# Patient Record
Sex: MALE | Race: WHITE | URBAN - METROPOLITAN AREA
[De-identification: names, ages, dates, MRNs, and addresses within clinical notes are randomized per-mention and may not be internally consistent; named-entity substitution may affect disease eponyms.]

---

## 2017-02-01 ENCOUNTER — OFFICE VISIT (OUTPATIENT)
Dept: FAMILY MEDICINE | Facility: CLINIC | Age: 47
End: 2017-02-01
Payer: COMMERCIAL

## 2017-02-01 VITALS
DIASTOLIC BLOOD PRESSURE: 86 MMHG | HEART RATE: 104 BPM | SYSTOLIC BLOOD PRESSURE: 138 MMHG | BODY MASS INDEX: 45.1 KG/M2 | HEIGHT: 70 IN | TEMPERATURE: 100.4 F | OXYGEN SATURATION: 95 % | WEIGHT: 315 LBS

## 2017-02-01 DIAGNOSIS — Z13.1 SCREENING FOR DIABETES MELLITUS: ICD-10-CM

## 2017-02-01 DIAGNOSIS — J32.9 OTHER SINUSITIS: Primary | ICD-10-CM

## 2017-02-01 DIAGNOSIS — R05.9 COUGH: ICD-10-CM

## 2017-02-01 DIAGNOSIS — Z13.220 LIPID SCREENING: ICD-10-CM

## 2017-02-01 DIAGNOSIS — I10 ESSENTIAL HYPERTENSION: ICD-10-CM

## 2017-02-01 PROCEDURE — 99203 OFFICE O/P NEW LOW 30 MIN: CPT | Performed by: PHYSICIAN ASSISTANT

## 2017-02-01 RX ORDER — LISINOPRIL 5 MG/1
5 TABLET ORAL DAILY
Qty: 30 TABLET | Refills: 1 | Status: SHIPPED | OUTPATIENT
Start: 2017-02-01 | End: 2017-04-05

## 2017-02-01 RX ORDER — ALBUTEROL SULFATE 90 UG/1
2 AEROSOL, METERED RESPIRATORY (INHALATION) EVERY 6 HOURS
Qty: 1 INHALER | Refills: 0 | Status: SHIPPED | OUTPATIENT
Start: 2017-02-01 | End: 2017-04-05

## 2017-02-01 RX ORDER — HYDROCHLOROTHIAZIDE 25 MG/1
25 TABLET ORAL DAILY
Qty: 30 TABLET | Refills: 1 | Status: SHIPPED | OUTPATIENT
Start: 2017-02-01 | End: 2017-04-05

## 2017-02-01 NOTE — PROGRESS NOTES
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 Is going to schedule DEBRA with fasting labs. New insurance     Problem list and histories reviewed & adjusted, as indicated.  Additional history: as documented    Patient Active Problem List   Diagnosis     Hyperlipidemia     Seizures (H)     Sleep apnea     Essential hypertension     Past Surgical History   Procedure Laterality Date     Tonsils and adnoids         Social History   Substance Use Topics     Smoking status: Never Smoker      Smokeless tobacco: Not on file     Alcohol Use: Yes      Comment: less than once a month      Family History   Problem Relation Age of Onset     Bronchitis Mother      Hypertension Mother          Current Outpatient Prescriptions   Medication Sig Dispense Refill     amoxicillin-clavulanate (AUGMENTIN) 875-125 MG per tablet Take 1 tablet by mouth 2 times daily 20 tablet 0     albuterol (PROAIR HFA/PROVENTIL HFA/VENTOLIN HFA) 108 (90 BASE) MCG/ACT Inhaler Inhale 2 puffs into the lungs every 6 hours 1 Inhaler 0     hydrochlorothiazide (HYDRODIURIL) 25 MG tablet Take 1 tablet (25 mg) by mouth daily 30 tablet 1     lisinopril (PRINIVIL/ZESTRIL) 5 MG tablet Take 1 tablet (5 mg) by mouth daily 30 tablet 1     No Known Allergies  Problem list, Medication list, Allergies, and  "Medical/Social/Surgical histories reviewed in [facility] and updated as appropriate.    ROS:  Constitutional, HEENT, cardiovascular, pulmonary, gi and gu systems are negative, except as otherwise noted.    OBJECTIVE:                                                    /86 mmHg  Pulse 104  Temp(Src) 100.4  F (38  C) (Oral)  Ht 5' 9.5\" (1.765 m)  Wt 346 lb (156.945 kg)  BMI 50.38 kg/m2  SpO2 95%  Body mass index is 50.38 kg/(m^2).  GENERAL: healthy, alert and no distress  EYES: Eyes grossly normal to inspection, PERRL and conjunctivae and sclerae normal  HENT: ear canals and TM's normal, nose and mouth without ulcers or lesions. Nasal congestion with posterior nasal drainage.  maxillary tenderness.   NECK: no adenopathy, no asymmetry, masses, or scars and thyroid normal to palpation  RESP:diffuse wheezes  CV: regular rate and rhythm, normal S1 S2, no S3 or S4, no murmur, click or rub, no peripheral edema     Diagnostic Test Results:  none      ASSESSMENT/PLAN:                                                          ICD-10-CM    1. Other sinusitis J32.9 amoxicillin-clavulanate (AUGMENTIN) 875-125 MG per tablet     albuterol (PROAIR HFA/PROVENTIL HFA/VENTOLIN HFA) 108 (90 BASE) MCG/ACT Inhaler   2. Cough R05 amoxicillin-clavulanate (AUGMENTIN) 875-125 MG per tablet     albuterol (PROAIR HFA/PROVENTIL HFA/VENTOLIN HFA) 108 (90 BASE) MCG/ACT Inhaler   3. Screening for diabetes mellitus Z13.1 Comprehensive metabolic panel   4. Lipid screening Z13.220 Lipid panel reflex to direct LDL   5. Essential hypertension I10 hydrochlorothiazide (HYDRODIURIL) 25 MG tablet     lisinopril (PRINIVIL/ZESTRIL) 5 MG tablet   meds refilled.   Warning signs discussed.  side effects discussed  Symptomatic treatment: such as fluids, OTC acetaminophen and /or non-steroidal anti-inflammatory medication.  Follow up  1-2 wks as needed     Cole Cheng PA-C  [facility]    "

## 2017-02-01 NOTE — MR AVS SNAPSHOT
"              After Visit Summary   2017    Jovi Kenney    MRN: [id number]           Patient Information     Date Of Birth          1970        Visit Information        Provider Department      2017 12:00 PM Cole Cheng PA-C [facility]        Today's Diagnoses     Other sinusitis    -  1     Cough            Follow-ups after your visit        Who to contact     If you have questions or need follow up information about today's clinic visit or your schedule please contact [facility] directly at [phone].  Normal or non-critical lab and imaging results will be communicated to you by [employer], letter or phone within 4 business days after the clinic has received the results. If you do not hear from us within 7 days, please contact the clinic through [employer] or phone. If you have a critical or abnormal lab result, we will notify you by phone as soon as possible.  Submit refill requests through [employer] or call your pharmacy and they will forward the refill request to us. Please allow 3 business days for your refill to be completed.          Additional Information About Your Visit        MyChart Information     [employer] lets you send messages to your doctor, view your test results, renew your prescriptions, schedule appointments and more. To sign up, go to [email or web address] . Click on \"Log in\" on the left side of the screen, which will take you to the Welcome page. Then click on \"Sign up Now\" on the right side of the page.     You will be asked to enter the access code listed below, as well as some personal information. Please follow the directions to create your username and password.     Your access code is: [email or web address]  Expires: 2017 12:14 PM     Your access code will  in 90 days. If you need help or a new code, please call your [facility] or [phone].        Care EveryWhere ID     This is your Care EveryWhere ID. This could be used by other " "organizations to access your [facility] medical records  [id number]        Your Vitals Were     Pulse Temperature Height BMI (Body Mass Index) Pulse Oximetry       104 100.4  F (38  C) (Oral) 5' 9.5\" (1.765 m) 50.38 kg/m2 95%        Blood Pressure from Last 3 Encounters:   02/01/17 138/86    Weight from Last 3 Encounters:   02/01/17 346 lb (156.945 kg)              Today, you had the following     No orders found for display         Today's Medication Changes          These changes are accurate as of: 2/1/17 12:14 PM.  If you have any questions, ask your nurse or doctor.               Start taking these medicines.        Dose/Directions    albuterol 108 (90 BASE) MCG/ACT Inhaler   Commonly known as:  PROAIR HFA/PROVENTIL HFA/VENTOLIN HFA   Used for:  Cough, Other sinusitis   Started by:  Cole Cheng PA-C        Dose:  2 puff   Inhale 2 puffs into the lungs every 6 hours   Quantity:  1 Inhaler   Refills:  0       amoxicillin-clavulanate 875-125 MG per tablet   Commonly known as:  AUGMENTIN   Used for:  Other sinusitis, Cough   Started by:  Cole Cheng PA-C        Dose:  1 tablet   Take 1 tablet by mouth 2 times daily   Quantity:  20 tablet   Refills:  0            Where to get your medicines      These medications were sent to [facility] [place], [place] - [address]  [address], [place] [address]     Phone:  [phone]    - albuterol 108 (90 BASE) MCG/ACT Inhaler  - amoxicillin-clavulanate 875-125 MG per tablet             Primary Care Provider Office Phone #    [facility] [phone]       No address on file        Thank you!     [facility] you for [facility]  [facility]. Our goal is always to provide you with excellent care. Hearing back from our patients is one way we can continue to improve our services. Please take a few minutes to complete the written survey that you may receive in the mail after your visit with us. Thank you!   "           Your Updated Medication List - Protect others around you: Learn how to safely use, store and throw away your medicines at www.disposemymeds.org.          This list is accurate as of: 2/1/17 12:14 PM.  Always use your most recent med list.                   Brand Name Dispense Instructions for use    albuterol 108 (90 BASE) MCG/ACT Inhaler    PROAIR HFA/PROVENTIL HFA/VENTOLIN HFA    1 Inhaler    Inhale 2 puffs into the lungs every 6 hours       amoxicillin-clavulanate 875-125 MG per tablet    AUGMENTIN    20 tablet    Take 1 tablet by mouth 2 times daily

## 2017-02-01 NOTE — NURSING NOTE
"Chief Complaint   Patient presents with     Cough       Initial /86 mmHg  Pulse 104  Temp(Src) 100.4  F (38  C) (Oral)  Ht 5' 9.5\" (1.765 m)  Wt 346 lb (156.945 kg)  BMI 50.38 kg/m2  SpO2 95% Estimated body mass index is 50.38 kg/(m^2) as calculated from the following:    Height as of this encounter: 5' 9.5\" (1.765 m).    Weight as of this encounter: 346 lb (156.945 kg).  BP completed using cuff size: [place] CMA      "

## 2017-02-01 NOTE — Clinical Note
[facility]  [address] [facility] [address]  Phone: [phone]    February 1, 2017        Jovi Kenney  [address] [facility] [address]          To whom it may concern:    RE: Jovi Kenney    Patient was seen and treated today at our clinic and missed work.       Please contact me for questions or concerns.      Sincerely,        Cole Cheng PA-C

## 2017-04-01 DIAGNOSIS — Z13.220 LIPID SCREENING: ICD-10-CM

## 2017-04-01 DIAGNOSIS — Z13.1 SCREENING FOR DIABETES MELLITUS: ICD-10-CM

## 2017-04-01 PROCEDURE — 80053 COMPREHEN METABOLIC PANEL: CPT | Performed by: PHYSICIAN ASSISTANT

## 2017-04-01 PROCEDURE — 80061 LIPID PANEL: CPT | Performed by: PHYSICIAN ASSISTANT

## 2017-04-01 PROCEDURE — 36415 COLL VENOUS BLD VENIPUNCTURE: CPT | Performed by: PHYSICIAN ASSISTANT

## 2017-04-03 LAB
ALBUMIN SERPL-MCNC: 4.1 G/DL (ref 3.4–5)
ALP SERPL-CCNC: 44 U/L (ref 40–150)
ALT SERPL W P-5'-P-CCNC: 67 U/L (ref 0–70)
ANION GAP SERPL CALCULATED.3IONS-SCNC: 13 MMOL/L (ref 3–14)
AST SERPL W P-5'-P-CCNC: 38 U/L (ref 0–45)
BILIRUB SERPL-MCNC: 0.5 MG/DL (ref 0.2–1.3)
BUN SERPL-MCNC: 14 MG/DL (ref 7–30)
CALCIUM SERPL-MCNC: 9 MG/DL (ref 8.5–10.1)
CHLORIDE SERPL-SCNC: 101 MMOL/L (ref 94–109)
CHOLEST SERPL-MCNC: 210 MG/DL
CO2 SERPL-SCNC: 25 MMOL/L (ref 20–32)
CREAT SERPL-MCNC: 1.04 MG/DL (ref 0.66–1.25)
GFR SERPL CREATININE-BSD FRML MDRD: 76 ML/MIN/1.7M2
GLUCOSE SERPL-MCNC: 88 MG/DL (ref 70–99)
HDLC SERPL-MCNC: 32 MG/DL
LDLC SERPL CALC-MCNC: 131 MG/DL
NONHDLC SERPL-MCNC: 178 MG/DL
POTASSIUM SERPL-SCNC: 3.7 MMOL/L (ref 3.4–5.3)
PROT SERPL-MCNC: 7.4 G/DL (ref 6.8–8.8)
SODIUM SERPL-SCNC: 139 MMOL/L (ref 133–144)
TRIGL SERPL-MCNC: 234 MG/DL

## 2017-04-05 ENCOUNTER — OFFICE VISIT (OUTPATIENT)
Dept: FAMILY MEDICINE | Facility: CLINIC | Age: 47
End: 2017-04-05
Payer: COMMERCIAL

## 2017-04-05 VITALS
HEIGHT: 70 IN | OXYGEN SATURATION: 99 % | SYSTOLIC BLOOD PRESSURE: 126 MMHG | BODY MASS INDEX: 45.1 KG/M2 | DIASTOLIC BLOOD PRESSURE: 83 MMHG | WEIGHT: 315 LBS | HEART RATE: 102 BPM

## 2017-04-05 DIAGNOSIS — L91.8 SKIN TAG: ICD-10-CM

## 2017-04-05 DIAGNOSIS — I10 ESSENTIAL HYPERTENSION: ICD-10-CM

## 2017-04-05 DIAGNOSIS — E78.5 HYPERLIPIDEMIA, UNSPECIFIED HYPERLIPIDEMIA TYPE: ICD-10-CM

## 2017-04-05 DIAGNOSIS — G47.30 SLEEP APNEA, UNSPECIFIED TYPE: ICD-10-CM

## 2017-04-05 DIAGNOSIS — Z00.00 ROUTINE GENERAL MEDICAL EXAMINATION AT A HEALTH CARE FACILITY: Primary | ICD-10-CM

## 2017-04-05 PROCEDURE — 17110 DESTRUCTION B9 LES UP TO 14: CPT | Performed by: PHYSICIAN ASSISTANT

## 2017-04-05 PROCEDURE — 99396 PREV VISIT EST AGE 40-64: CPT | Mod: 25 | Performed by: PHYSICIAN ASSISTANT

## 2017-04-05 RX ORDER — HYDROCHLOROTHIAZIDE 25 MG/1
25 TABLET ORAL DAILY
Qty: 90 TABLET | Refills: 1 | Status: SHIPPED | OUTPATIENT
Start: 2017-04-05 | End: 2017-10-05

## 2017-04-05 RX ORDER — LISINOPRIL 5 MG/1
5 TABLET ORAL DAILY
Qty: 90 TABLET | Refills: 1 | Status: SHIPPED | OUTPATIENT
Start: 2017-04-05 | End: 2017-10-05

## 2017-04-05 NOTE — MR AVS SNAPSHOT
After Visit Summary   4/5/2017    Jovi Kenney    MRN: [id number]           Patient Information     Date Of Birth          1970        Visit Information        Provider Department      4/5/2017 7:20 AM Cole Cheng PA-C [facility]        Today's Diagnoses     Routine general medical examination at a health care facility    -  1    Essential hypertension        Sleep apnea, unspecified type        Hyperlipidemia, unspecified hyperlipidemia type        BMI 50.0-59.9, adult (H)        Skin tag          Care Instructions      Preventive Health Recommendations  Male Ages 40 to 49    Yearly exam:             See your health care provider every year in order to  o   Review health changes.   o   Discuss preventive care.    o   Review your medicines if your doctor has prescribed any.    You should be tested each year for STDs (sexually transmitted diseases) if you re at risk.     Have a cholesterol test every 5 years.     Have a colonoscopy (test for colon cancer) if someone in your family has had colon cancer or polyps before age 50.     After age 45, have a diabetes test (fasting glucose). If you are at risk for diabetes, you should have this test every 3 years.      Talk with your health care provider about whether or not a prostate cancer screening test (PSA) is right for you.    Shots: Get a flu shot each year. Get a tetanus shot every 10 years.     Nutrition:    Eat at least 5 servings of fruits and vegetables daily.     Eat whole-grain bread, whole-wheat pasta and brown rice instead of white grains and rice.     Talk to your provider about Calcium and Vitamin D.     Lifestyle    Exercise for at least 150 minutes a week (30 minutes a day, 5 days a week). This will help you control your weight and prevent disease.     Limit alcohol to one drink per day.     No smoking.     Wear sunscreen to prevent skin cancer.     See your dentist every six months for an exam and cleaning.             Follow-ups after your visit        Additional Services     INTERNAL MEDICINE REFERRAL       Your provider has referred you to: [facility] [phone]   Dr. Juarez- Weight management     Please be aware that coverage of these services is subject to the terms and limitations of your health insurance plan.  Call member services at your health plan with any benefit or coverage questions.      Please bring the following to your appointment:  >>   Any x-rays, CTs or MRIs which have been performed.  Contact the facility where they were done to arrange for  prior to your scheduled appointment.   >>   List of current medications   >>   This referral request   >>   Any documents/labs given to you for this referral            SLEEP EVALUATION & MANAGEMENT REFERRAL - ADULT       Please be aware that coverage of these services is subject to the terms and limitations of your health insurance plan.  Call member services at your health plan with any benefit or coverage questions.      Please bring the following to your appointment:    >>   List of current medications   >>   This referral request   >>   Any documents/labs given to you for this referral    [facility] [phone] (Age 15 and up)                  Future tests that were ordered for you today     Open Future Orders        Priority Expected Expires Ordered    SLEEP EVALUATION & MANAGEMENT REFERRAL - ADULT Routine  4/5/2018 4/5/2017            Who to contact     If you have questions or need follow up information about today's clinic visit or your schedule please contact [facility] directly at [phone].  Normal or non-critical lab and imaging results will be communicated to you by MyChart, letter or phone within 4 business days after the clinic has received the results. If you do not hear from us within 7 days, please contact the clinic through MyChart or phone. If you have a  "critical or abnormal lab result, we will notify you by phone as soon as possible.  Submit refill requests through [employer] or call your pharmacy and they will forward the refill request to us. Please allow 3 business days for your refill to be completed.          Additional Information About Your Visit        [employer] Information     [employer] lets you send messages to your doctor, view your test results, renew your prescriptions, schedule appointments and more. To sign up, go to [email or web address] . Click on \"Log in\" on the left side of the screen, which will take you to the Welcome page. Then click on \"Sign up Now\" on the right side of the page.     You will be asked to enter the access code listed below, as well as some personal information. Please follow the directions to create your username and password.     Your access code is: [email or web address]  Expires: 2017  1:14 PM     Your access code will  in 90 days. If you need help or a new code, please call your [place] clinic or [phone].        Care EveryWhere ID     This is your Care EveryWhere ID. This could be used by other organizations to access your [place] medical records  [id number]        Your Vitals Were     Pulse Height Pulse Oximetry BMI (Body Mass Index)          102 5' 9.5\" (1.765 m) 99% 50.94 kg/m2         Blood Pressure from Last 3 Encounters:   17 126/83   17 138/86    Weight from Last 3 Encounters:   17 (!) 350 lb (158.8 kg)   17 (!) 346 lb (156.9 kg)              We Performed the Following     DESTRUCT BENIGN LESION, UP TO 14     INTERNAL MEDICINE REFERRAL          Where to get your medicines      These medications were sent to [facility] [address] [facility] [address]  [address] [address] [address]     Phone:  [phone]     hydrochlorothiazide 25 MG tablet    lisinopril 5 MG tablet          Primary Care Provider Office Phone #    [facility] " [phone]       No address on file        Thank you!     Thank you for choosing [facility]  for your care. Our goal is always to provide you with excellent care. Hearing back from our patients is one way we can continue to improve our services. Please take a few minutes to complete the written survey that you may receive in the mail after your visit with us. Thank you!             Your Updated Medication List - Protect others around you: Learn how to safely use, store and throw away your medicines at www.disposemymeds.org.          This list is accurate as of: 4/5/17  7:42 AM.  Always use your most recent med list.                   Brand Name Dispense Instructions for use    CLARITIN PO          hydrochlorothiazide 25 MG tablet    HYDRODIURIL    90 tablet    Take 1 tablet (25 mg) by mouth daily       lisinopril 5 MG tablet    PRINIVIL/ZESTRIL    90 tablet    Take 1 tablet (5 mg) by mouth daily

## 2017-04-05 NOTE — PROGRESS NOTES
SUBJECTIVE:     CC: Jovi Kenney is an 47 year old male who presents for preventative health visit.     Healthy Habits:    Do you get at least three servings of calcium containing foods daily (dairy, green leafy vegetables, etc.)? yes    Amount of exercise or daily activities, outside of work: about 3 times a week 1.5 mile walk    Problems taking medications regularly No    Medication side effects: No    Have you had an eye exam in the past two years? no    Do you see a dentist twice per year? no    Do you have sleep apnea, excessive snoring or daytime drowsiness?yes - uses cpap- 2015.      Skin tag under the left eye, interferes with his vision at times- been there coupe years.    Today's PHQ-2 Score:   PHQ-2 ( 1999 Pfizer) 2/1/2017   Q1: Little interest or pleasure in doing things 0   Q2: Feeling down, depressed or hopeless 0   PHQ-2 Score 0       Abuse: Current or Past(Physical, Sexual or Emotional)- No  Do you feel safe in your environment - Yes    Social History   Substance Use Topics     Smoking status: Never Smoker     Smokeless tobacco: Not on file     Alcohol use Yes      Comment: less than once a month      The patient does not drink >3 drinks per day nor >7 drinks per week.    Last PSA: No results found for: PSA    Recent Labs   Lab Test  04/01/17   1102   CHOL  210*   HDL  32*   LDL  131*   TRIG  234*   NHDL  178*       Reviewed orders with patient. Reviewed health maintenance and updated orders accordingly - Yes    Reviewed and updated as needed this visit by clinical staff  Tobacco  Allergies  Meds  Problems  Med Hx  Surg Hx  Fam Hx  Soc Hx          Reviewed and updated as needed this visit by Provider  Allergies  Meds  Problems          Past Medical History:   Diagnosis Date     Hyperlipidemia      Hypertension      Seizures (H)     last was in highschool     Sleep apnea       Past Surgical History:   Procedure Laterality Date     tonsils and adnoids         ROS:  C: NEGATIVE for fever,  chills, change in weight  I: NEGATIVE for worrisome rashes, moles or lesions  E: NEGATIVE for vision changes or irritation  ENT: NEGATIVE for ear, mouth and throat problems  R: NEGATIVE for significant cough or SOB  CV: NEGATIVE for chest pain, palpitations or peripheral edema  GI: NEGATIVE for nausea, abdominal pain, heartburn, or change in bowel habits   male: negative for dysuria, hematuria, decreased urinary stream, erectile dysfunction, urethral discharge  M: NEGATIVE for significant arthralgias or myalgia  N: NEGATIVE for weakness, dizziness or paresthesias  P: NEGATIVE for changes in mood or affect    Problem list, Medication list, Allergies, and Medical/Social/Surgical histories reviewed in [facility] and updated as appropriate.  Labs reviewed in EPIC  BP Readings from Last 3 Encounters:   04/05/17 126/83   02/01/17 138/86    Wt Readings from Last 3 Encounters:   04/05/17 (!) 350 lb (158.8 kg)   02/01/17 (!) 346 lb (156.9 kg)                  Patient Active Problem List   Diagnosis     Seizures (H)     Essential hypertension     Sleep apnea, unspecified type     Hyperlipidemia, unspecified hyperlipidemia type     BMI 50.0-59.9, adult (H)     Skin tag     Past Surgical History:   Procedure Laterality Date     tonsils and adnoids         Social History   Substance Use Topics     Smoking status: Never Smoker     Smokeless tobacco: Not on file     Alcohol use Yes      Comment: less than once a month      Family History   Problem Relation Age of Onset     Bronchitis Mother      Hypertension Mother          Current Outpatient Prescriptions   Medication Sig Dispense Refill     Loratadine (CLARITIN PO)        hydrochlorothiazide (HYDRODIURIL) 25 MG tablet Take 1 tablet (25 mg) by mouth daily 90 tablet 1     lisinopril (PRINIVIL/ZESTRIL) 5 MG tablet Take 1 tablet (5 mg) by mouth daily 90 tablet 1     [DISCONTINUED] hydrochlorothiazide (HYDRODIURIL) 25 MG tablet Take 1 tablet (25 mg) by mouth daily 30 tablet 1      "[DISCONTINUED] lisinopril (PRINIVIL/ZESTRIL) 5 MG tablet Take 1 tablet (5 mg) by mouth daily 30 tablet 1     No Known Allergies  OBJECTIVE:     /83  Pulse 102  Ht 5' 9.5\" (1.765 m)  Wt (!) 350 lb (158.8 kg)  SpO2 99%  BMI 50.94 kg/m2  EXAM:  GENERAL: healthy, alert and no distress  EYES: Eyes grossly normal to inspection, PERRL and conjunctivae and sclerae normal  HENT: ear canals and TM's normal, nose and mouth without ulcers or lesions  NECK: no adenopathy, no asymmetry, masses, or scars and thyroid normal to palpation  RESP: lungs clear to auscultation - no rales, rhonchi or wheezes  CV: regular rate and rhythm, normal S1 S2, no S3 or S4, no murmur, click or rub, no peripheral edema and peripheral pulses strong  ABDOMEN: soft, nontender, no hepatosplenomegaly, no masses and bowel sounds normal   (male): normal male genitalia without lesions or urethral discharge, no hernia  MS: no gross musculoskeletal defects noted, no edema  SKIN: 2 skin tags left eye lid  NEURO: Normal strength and tone, mentation intact and speech normal    ASSESSMENT/PLAN:         ICD-10-CM    1. Routine general medical examination at a health care facility Z00.00    2. Essential hypertension I10 hydrochlorothiazide (HYDRODIURIL) 25 MG tablet     lisinopril (PRINIVIL/ZESTRIL) 5 MG tablet   3. Sleep apnea, unspecified type G47.30 SLEEP EVALUATION & MANAGEMENT REFERRAL - ADULT   4. Hyperlipidemia, unspecified hyperlipidemia type E78.5    5. BMI 50.0-59.9, adult (H) Z68.43 INTERNAL MEDICINE REFERRAL   6. Skin tag L91.8 DESTRUCT BENIGN LESION, UP TO 14   Use of liquid nitrogen was discussed. warning signs discussed. side effects discussed. It was used on skin lesion x 3.  Repeat 2 wks if not gone.   Work on Healthy diet and exercise. Getting heart rate elevated for 30 mins most days of week.  Recheck blood pressure in 6 months.   COUNSELING:  Reviewed preventive health counseling, as reflected in patient instructions       Regular " "exercise       Healthy diet/nutrition         reports that he has never smoked. He does not have any smokeless tobacco history on file.    Estimated body mass index is 50.94 kg/(m^2) as calculated from the following:    Height as of this encounter: 5' 9.5\" (1.765 m).    Weight as of this encounter: 350 lb (158.8 kg).   Weight management plan: Discussed healthy diet and exercise guidelines and patient will follow up in 12 months in clinic to re-evaluate.    Counseling Resources:  ATP IV Guidelines  Pooled Cohorts Equation Calculator  FRAX Risk Assessment  ICSI Preventive Guidelines  Dietary Guidelines for Americans, 2010  USDA's MyPlate  ASA Prophylaxis  Lung CA Screening    The 10-year ASCVD risk score (Sapnawilmer RODRIGUEZ Jr, et al., 2013) is: 5.7%    Values used to calculate the score:      Age: 47 years      Sex: Male      Is Non- : No      Diabetic: No      Tobacco smoker: No      Systolic Blood Pressure: 126 mmHg      Is BP treated: Yes      HDL Cholesterol: 32 mg/dL      Total Cholesterol: 210 mg/dL   Cole Cheng PA-C  [facility]  "

## 2017-04-05 NOTE — NURSING NOTE
"Chief Complaint   Patient presents with     Physical       Initial /83  Pulse 102  Ht 5' 9.5\" (1.765 m)  Wt (!) 350 lb (158.8 kg)  SpO2 99%  BMI 50.94 kg/m2 Estimated body mass index is 50.94 kg/(m^2) as calculated from the following:    Height as of this encounter: 5' 9.5\" (1.765 m).    Weight as of this encounter: 350 lb (158.8 kg).  Medication Reconciliation: complete  Ashlee North CMA    "

## 2017-10-05 ENCOUNTER — TELEPHONE (OUTPATIENT)
Dept: FAMILY MEDICINE | Facility: CLINIC | Age: 47
End: 2017-10-05

## 2017-10-05 DIAGNOSIS — I10 ESSENTIAL HYPERTENSION: ICD-10-CM

## 2017-10-05 RX ORDER — HYDROCHLOROTHIAZIDE 25 MG/1
25 TABLET ORAL DAILY
Qty: 30 TABLET | Refills: 0 | Status: SHIPPED | OUTPATIENT
Start: 2017-10-05 | End: 2018-05-09

## 2017-10-05 RX ORDER — LISINOPRIL 5 MG/1
5 TABLET ORAL DAILY
Qty: 30 TABLET | Refills: 0 | Status: SHIPPED | OUTPATIENT
Start: 2017-10-05 | End: 2018-05-09

## 2017-10-05 NOTE — TELEPHONE ENCOUNTER
Lisinopril and Hydrochlorothiazide patient is requesting refills on both of these medications. Please call patient to advise. Thank you

## 2017-10-05 NOTE — TELEPHONE ENCOUNTER
#30 only as patient due for BP OV with PCP     TC, please assist patient with appointment with provider.     Meredith Feliciano RN

## 2017-10-06 NOTE — TELEPHONE ENCOUNTER
Notified patient needs to be seen for further refills. He will call back to schedule.  PIPER Mathews/Low

## 2018-04-03 ENCOUNTER — OFFICE VISIT (OUTPATIENT)
Dept: PODIATRY | Facility: CLINIC | Age: 48
End: 2018-04-03
Payer: COMMERCIAL

## 2018-04-03 VITALS
SYSTOLIC BLOOD PRESSURE: 162 MMHG | BODY MASS INDEX: 45.1 KG/M2 | DIASTOLIC BLOOD PRESSURE: 99 MMHG | HEIGHT: 70 IN | WEIGHT: 315 LBS

## 2018-04-03 DIAGNOSIS — L60.0 INGROWING NAIL: Primary | ICD-10-CM

## 2018-04-03 DIAGNOSIS — B35.1 DERMATOPHYTOSIS OF NAIL: ICD-10-CM

## 2018-04-03 PROCEDURE — 80076 HEPATIC FUNCTION PANEL: CPT | Performed by: PODIATRIST

## 2018-04-03 PROCEDURE — 99204 OFFICE O/P NEW MOD 45 MIN: CPT | Mod: 25 | Performed by: PODIATRIST

## 2018-04-03 PROCEDURE — 36415 COLL VENOUS BLD VENIPUNCTURE: CPT | Performed by: PODIATRIST

## 2018-04-03 PROCEDURE — 11730 AVULSION NAIL PLATE SIMPLE 1: CPT | Mod: T5 | Performed by: PODIATRIST

## 2018-04-03 RX ORDER — TERBINAFINE HYDROCHLORIDE 250 MG/1
250 TABLET ORAL DAILY
Qty: 30 TABLET | Refills: 2 | Status: SHIPPED | OUTPATIENT
Start: 2018-04-03 | End: 2018-10-17

## 2018-04-03 NOTE — PATIENT INSTRUCTIONS
We wish you continued good healing. If you have any questions or concerns, please do not hesitate to contact us at [phone]    Please remember to call and schedule a follow up appointment if one was recommended at your earliest convenience.   PODIATRY CLINIC HOURS  TELEPHONE NUMBER    Dr. Dima Duran D.P.M [facility]    Clinics:  [facility]    [facility]   Tuesday 1PM-6PM  [place]  Wednesday 7AM-2PM  [facility]  Thursday 10AM-6PM  [place]  Friday 7AM-3PM  [place]  Specialty schedulers:   [phone] to make an appointment with any Specialty Provider.        Urgent Care locations:    [facility] Monday-Friday 5 pm - 9 pm. Saturday-Sunday 9 am -5pm    Monday-Friday 11 am - 9 pm Saturday 9 am - 5 pm     Monday-Sunday 12 noon-8PM [phone]     If you need a medication refill, please contact us you may need lab work and/or a follow up visit prior to your refill (i.e. Antifungal medications).    Ecoviatet (secure e-mail communication and access to your chart) to send a message or to make an appointment.    If MRI needed please call [place] [facility] at [phone]        Weight management plan: Patient was referred to their PCP to discuss a diet and exercise plan.     Patient to follow up with Primary Care provider regarding elevated blood pressure.

## 2018-04-03 NOTE — MR AVS SNAPSHOT
After Visit Summary   4/3/2018    Jovi Kenney    MRN: [id number]           Patient Information     Date Of Birth          1970        Visit Information        Provider Department      4/3/2018 5:00 PM Dima Duran, DPDANIEL [facility] [place]        Today's Diagnoses     Ingrowing nail    -  1    Dermatophytosis of nail          Care Instructions    We wish you continued good healing. If you have any questions or concerns, please do not hesitate to contact us at [phone]    Please remember to call and schedule a follow up appointment if one was recommended at your earliest convenience.   PODIATRY CLINIC HOURS  TELEPHONE NUMBER    Dr. Dima SCHROEDERPYOCASTA FAC FAS    Clinics:  [facility]    [facility]   Tuesday 1PM-6PM  [place]  Wednesday 7AM-2PM  [facility]  Thursday 10AM-6PM  [place]  Friday 7AM-3PM  [place]  Specialty schedulers:   [phone] to make an appointment with any Specialty Provider.        Urgent Care locations:    [facility] Monday-Friday 5 pm - 9 pm. Saturday-Sunday 9 am -5pm    Monday-Friday 11 am - 9 pm Saturday 9 am - 5 pm     Monday-Sunday 12 noon-8PM [phone]     If you need a medication refill, please contact us you may need lab work and/or a follow up visit prior to your refill (i.e. Antifungal medications).    PhysicianPortalt (secure e-mail communication and access to your chart) to send a message or to make an appointment.    If MRI needed please call [place] [facility] at [phone]        Weight management plan: Patient was referred to their PCP to discuss a diet and exercise plan.     Patient to follow up with Primary Care provider regarding elevated blood pressure.              Follow-ups after your visit        Who to contact     If you have questions or need follow up information about  "today's clinic visit or your schedule please contact [facility] [place] directly at [phone].  Normal or non-critical lab and imaging results will be communicated to you by PeopleCubehart, letter or phone within 4 business days after the clinic has received the results. If you do not hear from us within 7 days, please contact the clinic through NaturVentiont or phone. If you have a critical or abnormal lab result, we will notify you by phone as soon as possible.  Submit refill requests through OnLive or call your pharmacy and they will forward the refill request to us. Please allow 3 business days for your refill to be completed.          Additional Information About Your Visit        OnLive Information     OnLive gives you secure access to your electronic health record. If you see a primary care provider, you can also send messages to your care team and make appointments. If you have questions, please call your primary care clinic.  If you do not have a primary care provider, please call [phone] and they will assist you.        Care EveryWhere ID     This is your Care EveryWhere ID. This could be used by other organizations to access your [place] medical records  [id number]        Your Vitals Were     Height BMI (Body Mass Index)                5' 9.5\" (1.765 m) 53.42 kg/m2           Blood Pressure from Last 3 Encounters:   04/03/18 (!) 162/99   04/05/17 126/83   02/01/17 138/86    Weight from Last 3 Encounters:   04/03/18 (!) 367 lb (166.5 kg)   04/05/17 (!) 350 lb (158.8 kg)   02/01/17 (!) 346 lb (156.9 kg)              We Performed the Following     Hepatic panel     REMOVAL OF NAIL PLATE SIMPLE SINGLE          Today's Medication Changes          These changes are accurate as of 4/3/18  5:46 PM.  If you have any questions, ask your nurse or doctor.               Start taking these medicines.        Dose/Directions    terbinafine 250 MG tablet   Commonly known as:  lamISIL   Used for:  " Dermatophytosis of nail   Started by:  Dima Duran DPM        Dose:  250 mg   Take 1 tablet (250 mg) by mouth daily   Quantity:  30 tablet   Refills:  2            Where to get your medicines      These medications were sent to [id number] - [place], [place] - [address]  [address], [place] [address]     Phone:  [phone]     terbinafine 250 MG tablet                Primary Care Provider Office Phone # Fax #    [facility] [place] [facility] [phone] [address]  [place] [address]        Equal Access to Services     [facility]: Hadii aad ku hadasho Soomaali, waaxda luqadaha, qaybta kaalmada adeegyada, waxay danielin hayaan cody ball . [facility] [phone].    ATENCIÓN: Si habla español, tiene a bush disposición servicios gratuitos de asistencia lingüística. [facility] [phone].    We comply with applicable federal civil rights laws and [place] laws. We do not discriminate on the basis of race, color, national origin, age, disability, sex, sexual orientation, or gender identity.            Thank you!     Thank you for choosing [facility]  for your care. Our goal is always to provide you with excellent care. Hearing back from our patients is one way we can continue to improve our services. Please take a few minutes to complete the written survey that you may receive in the mail after your visit with us. Thank you!             Your Updated Medication List - Protect others around you: Learn how to safely use, store and throw away your medicines at www.disposemymeds.org.          This list is accurate as of 4/3/18  5:46 PM.  Always use your most recent med list.                   Brand Name Dispense Instructions for use Diagnosis    CLARITIN PO           hydrochlorothiazide 25 MG tablet    HYDRODIURIL    30 tablet    Take 1 tablet (25 mg) by mouth daily    Essential hypertension       lisinopril 5 MG tablet     PRINIVIL/ZESTRIL    30 tablet    Take 1 tablet (5 mg) by mouth daily    Essential hypertension       terbinafine 250 MG tablet    lamISIL    30 tablet    Take 1 tablet (250 mg) by mouth daily    Dermatophytosis of nail

## 2018-04-03 NOTE — LETTER
4/3/2018         RE: Jovi Kenney  [address]  [place] [address]        Dear Colleague,    Thank you for referring your patient, Jovi Kenney, to the [facility] [place]. Please see a copy of my visit note below.    Patient complains of thick right first toenails(s) .  Has had this for 1 years.  It is slowly getting worse.  Has had pain in the past which is aggravated by activity and relieved by rest.  Now has drainage, erythema, pain both borders.  Tried over the counter medications without success.  Does not like the look of the nail and is wondering about options.  Patient denies heavy OH use.  Denies high cholesterol or being on any statin medication.  Patient does have other family member with this problem.        ROS:  A 10-point review of systems was performed and is positive for that noted in the HPI and as seen below.  All other areas are negative.        No Known Allergies    Current Outpatient Prescriptions   Medication Sig Dispense Refill     terbinafine (LAMISIL) 250 MG tablet Take 1 tablet (250 mg) by mouth daily 30 tablet 2     Loratadine (CLARITIN PO)        hydrochlorothiazide (HYDRODIURIL) 25 MG tablet Take 1 tablet (25 mg) by mouth daily (Patient not taking: Reported on 4/3/2018) 30 tablet 0     lisinopril (PRINIVIL/ZESTRIL) 5 MG tablet Take 1 tablet (5 mg) by mouth daily (Patient not taking: Reported on 4/3/2018) 30 tablet 0       Patient Active Problem List   Diagnosis     Seizures (H)     Essential hypertension     Sleep apnea, unspecified type     Hyperlipidemia, unspecified hyperlipidemia type     BMI 50.0-59.9, adult (H)     Skin tag       Past Medical History:   Diagnosis Date     Hyperlipidemia      Hypertension      Seizures (H)     last was in highschool     Sleep apnea        Past Surgical History:   Procedure Laterality Date     tonsils and adnoids         Family History   Problem Relation Age of Onset     Bronchitis Mother      Hypertension Mother   "      Social History   Substance Use Topics     Smoking status: Never Smoker     Smokeless tobacco: Never Used     Alcohol use Yes      Comment: less than once a month          BP (!) 162/99 (BP Location: Right arm, Patient Position: Sitting, Cuff Size: Adult Large)  Ht 5' 9.5\" (1.765 m)  Wt (!) 367 lb (166.5 kg)  BMI 53.42 kg/m2.      VConstitutional/ general:  Pt is in no apparent distress, appears well-nourished.  Cooperative with history and physical exam. Seen with wife.    Psych:  The patient answered questions appropriately.  Normal affect.  Seems to have reasonable expectations, in terms of treatment.    Eyes:  Visual scanning/ tracking without deficit.    Ears:  Response to auditory stimuli is normal.  negative hearing aid devices.  Auricles in proper alignment.     Lymphatic:  Popliteal lymph nodes not enlarged.     Lungs:  Non labored breathing, non labored speech. No cough.  No audible wheezing. Even, quiet breathing.       Vascular:  Pedal pulses are palpable bilaterally for both the DP and PT arteries.  CFT < 3 sec.  No edema.  Pedal hair growth noted.     Neuro:  Alert and oriented x 3. Coordinated gait.  Light touch sensation is intact to the L4, L5, S1 distributions. No obvious deficits.  No evidence of neurological-based weakness, spasticity, or contracture in the lower extremities.    Derm: Normal texture and turgor.  No erythema, ecchymosis, or cyanosis.  No open lesions. right first toenails(s)  thickened, elongated, discolored, with subungual debris.  Both borders ingrown and there is erythema, edema, drainage, pain on palpation.  No signs of subungual masses or exostosis.    Musculoskeletal:    Lower extremity muscle strength is normal.  Patient is ambulatory without an assistive device or brace.  No gross deformities.  MS 5/5 all compartments.  Normal arch with weightbearing.         A/P  Onychomycosis          Ingrown nail      Discussed etiology and treatment options with the pt.  Risks " and benefits of partial temporary nail removal were discussed in detail.  Obtained consent and blocked hallux using 3 cc of 1% Lidocaine plain into the right hallux.  Sterile prep and avulsed the both border and placed dry sterile dressing.  Cappillary refill time wnl.  Pt tolerated procedure well.  Wound care instructions given.    Discussed all options with patient.  Would like to try lamisil.  Risks, complications, and efficacy of going on this pill were discussed with the patient.  Will start lamisil 250mg, dispense 30, one per oral every day.  Two refils.  Will get LFTs today for baseline.  RTC in 3 months.    Dima Duran DPM, FACFAS                  Again, thank you for allowing me to participate in the care of your patient.        Sincerely,        Dima Duran DPM

## 2018-04-03 NOTE — PROGRESS NOTES
Patient complains of thick right first toenails(s) .  Has had this for 1 years.  It is slowly getting worse.  Has had pain in the past which is aggravated by activity and relieved by rest.  Now has drainage, erythema, pain both borders.  Tried over the counter medications without success.  Does not like the look of the nail and is wondering about options.  Patient denies heavy OH use.  Denies high cholesterol or being on any statin medication.  Patient does have other family member with this problem.        ROS:  A 10-point review of systems was performed and is positive for that noted in the HPI and as seen below.  All other areas are negative.        No Known Allergies    Current Outpatient Prescriptions   Medication Sig Dispense Refill     terbinafine (LAMISIL) 250 MG tablet Take 1 tablet (250 mg) by mouth daily 30 tablet 2     Loratadine (CLARITIN PO)        hydrochlorothiazide (HYDRODIURIL) 25 MG tablet Take 1 tablet (25 mg) by mouth daily (Patient not taking: Reported on 4/3/2018) 30 tablet 0     lisinopril (PRINIVIL/ZESTRIL) 5 MG tablet Take 1 tablet (5 mg) by mouth daily (Patient not taking: Reported on 4/3/2018) 30 tablet 0       Patient Active Problem List   Diagnosis     Seizures (H)     Essential hypertension     Sleep apnea, unspecified type     Hyperlipidemia, unspecified hyperlipidemia type     BMI 50.0-59.9, adult (H)     Skin tag       Past Medical History:   Diagnosis Date     Hyperlipidemia      Hypertension      Seizures (H)     last was in highschool     Sleep apnea        Past Surgical History:   Procedure Laterality Date     tonsils and adnoids         Family History   Problem Relation Age of Onset     Bronchitis Mother      Hypertension Mother        Social History   Substance Use Topics     Smoking status: Never Smoker     Smokeless tobacco: Never Used     Alcohol use Yes      Comment: less than once a month          BP (!) 162/99 (BP Location: Right arm, Patient Position: Sitting, Cuff  "Size: Adult Large)  Ht 5' 9.5\" (1.765 m)  Wt (!) 367 lb (166.5 kg)  BMI 53.42 kg/m2.      VConstitutional/ general:  Pt is in no apparent distress, appears well-nourished.  Cooperative with history and physical exam. Seen with wife.    Psych:  The patient answered questions appropriately.  Normal affect.  Seems to have reasonable expectations, in terms of treatment.    Eyes:  Visual scanning/ tracking without deficit.    Ears:  Response to auditory stimuli is normal.  negative hearing aid devices.  Auricles in proper alignment.     Lymphatic:  Popliteal lymph nodes not enlarged.     Lungs:  Non labored breathing, non labored speech. No cough.  No audible wheezing. Even, quiet breathing.       Vascular:  Pedal pulses are palpable bilaterally for both the DP and PT arteries.  CFT < 3 sec.  No edema.  Pedal hair growth noted.     Neuro:  Alert and oriented x 3. Coordinated gait.  Light touch sensation is intact to the L4, L5, S1 distributions. No obvious deficits.  No evidence of neurological-based weakness, spasticity, or contracture in the lower extremities.    Derm: Normal texture and turgor.  No erythema, ecchymosis, or cyanosis.  No open lesions. right first toenails(s)  thickened, elongated, discolored, with subungual debris.  Both borders ingrown and there is erythema, edema, drainage, pain on palpation.  No signs of subungual masses or exostosis.    Musculoskeletal:    Lower extremity muscle strength is normal.  Patient is ambulatory without an assistive device or brace.  No gross deformities.  MS 5/5 all compartments.  Normal arch with weightbearing.         A/P  Onychomycosis          Ingrown nail      Discussed etiology and treatment options with the pt.  Risks and benefits of partial temporary nail removal were discussed in detail.  Obtained consent and blocked hallux using 3 cc of 1% Lidocaine plain into the right hallux.  Sterile prep and avulsed the both border and placed dry sterile dressing.  " Cappillary refill time wnl.  Pt tolerated procedure well.  Wound care instructions given.    Discussed all options with patient.  Would like to try lamisil.  Risks, complications, and efficacy of going on this pill were discussed with the patient.  Will start lamisil 250mg, dispense 30, one per oral every day.  Two refils.  Will get LFTs today for baseline.  RTC in 3 months.    Dima Duran DPM, FACFAS

## 2018-04-04 LAB
ALBUMIN SERPL-MCNC: 3.9 G/DL (ref 3.4–5)
ALP SERPL-CCNC: 54 U/L (ref 40–150)
ALT SERPL W P-5'-P-CCNC: 60 U/L (ref 0–70)
AST SERPL W P-5'-P-CCNC: 42 U/L (ref 0–45)
BILIRUB DIRECT SERPL-MCNC: <0.1 MG/DL (ref 0–0.2)
BILIRUB SERPL-MCNC: 0.3 MG/DL (ref 0.2–1.3)
PROT SERPL-MCNC: 7.3 G/DL (ref 6.8–8.8)

## 2018-05-09 ENCOUNTER — OFFICE VISIT (OUTPATIENT)
Dept: FAMILY MEDICINE | Facility: CLINIC | Age: 48
End: 2018-05-09
Payer: COMMERCIAL

## 2018-05-09 VITALS
OXYGEN SATURATION: 96 % | RESPIRATION RATE: 16 BRPM | BODY MASS INDEX: 45.1 KG/M2 | HEIGHT: 70 IN | WEIGHT: 315 LBS | HEART RATE: 93 BPM | DIASTOLIC BLOOD PRESSURE: 88 MMHG | SYSTOLIC BLOOD PRESSURE: 130 MMHG | TEMPERATURE: 98.7 F

## 2018-05-09 DIAGNOSIS — Z00.00 ROUTINE GENERAL MEDICAL EXAMINATION AT A HEALTH CARE FACILITY: Primary | ICD-10-CM

## 2018-05-09 DIAGNOSIS — I10 ESSENTIAL HYPERTENSION: ICD-10-CM

## 2018-05-09 DIAGNOSIS — G47.30 SLEEP APNEA, UNSPECIFIED TYPE: ICD-10-CM

## 2018-05-09 PROCEDURE — 99396 PREV VISIT EST AGE 40-64: CPT | Performed by: FAMILY MEDICINE

## 2018-05-09 RX ORDER — LISINOPRIL 10 MG/1
10 TABLET ORAL DAILY
Qty: 90 TABLET | Refills: 1 | Status: SHIPPED | OUTPATIENT
Start: 2018-05-09 | End: 2018-11-15

## 2018-05-09 RX ORDER — HYDROCHLOROTHIAZIDE 25 MG/1
25 TABLET ORAL DAILY
Qty: 90 TABLET | Refills: 1 | Status: SHIPPED | OUTPATIENT
Start: 2018-05-09 | End: 2018-11-15

## 2018-05-09 ASSESSMENT — ENCOUNTER SYMPTOMS: HEADACHES: 1

## 2018-05-09 ASSESSMENT — PAIN SCALES - GENERAL: PAINLEVEL: NO PAIN (0)

## 2018-05-09 NOTE — PROGRESS NOTES
SUBJECTIVE:   CC: Jovi Kenney is an 48 year old male who presents for preventative health visit.     Physical   Annual:     Getting at least 3 servings of Calcium per day::  Yes    Bi-annual eye exam::  Yes    Dental care twice a year::  NO    Sleep apnea or symptoms of sleep apnea::  Sleep apnea    Diet::  Regular (no restrictions)    Frequency of exercise::  1 day/week    Duration of exercise::  15-30 minutes    Taking medications regularly::  Yes    Medication side effects::  None    Additional concerns today::  YES          Headache            Today's PHQ-2 Score:   PHQ-2 ( 1999 Pf[employer]) 5/9/2018   Q1: Little interest or pleasure in doing things 0   Q2: Feeling down, depressed or hopeless 0   PHQ-2 Score 0   Q1: Little interest or pleasure in doing things Not at all   Q2: Feeling down, depressed or hopeless Not at all   PHQ-2 Score 0       Abuse: Current or Past(Physical, Sexual or Emotional)- No  Do you feel safe in your environment - Yes    Social History   Substance Use Topics     Smoking status: Never Smoker     Smokeless tobacco: Never Used     Alcohol use Yes      Comment: less than once a month      Alcohol Use 5/9/2018   If you drink alcohol do you typically have greater than 3 drinks per day OR greater than 7 drinks per week? No       Last PSA: No results found for: PSA    Reviewed orders with patient. Reviewed health maintenance and updated orders accordingly - Yes  SUBJECTIVE:  48 year oldyear old male enters with a hx of hypertension.  Pt. Has been compliant with medications and medications were reviewed.  No side effects. No chest pain or sob. Low sodium diet.    Current Outpatient Prescriptions:      Fexofenadine HCl (ALLEGRA PO), , Disp: , Rfl:      hydrochlorothiazide (HYDRODIURIL) 25 MG tablet, Take 1 tablet (25 mg) by mouth daily, Disp: 90 tablet, Rfl: 1     lisinopril (PRINIVIL/ZESTRIL) 10 MG tablet, Take 1 tablet (10 mg) by mouth daily, Disp: 90 tablet, Rfl: 1     terbinafine (LAMISIL) 250 MG  "tablet, Take 1 tablet (250 mg) by mouth daily, Disp: 30 tablet, Rfl: 2     [DISCONTINUED] hydrochlorothiazide (HYDRODIURIL) 25 MG tablet, Take 1 tablet (25 mg) by mouth daily (Patient not taking: Reported on 4/3/2018), Disp: 30 tablet, Rfl: 0     [DISCONTINUED] lisinopril (PRINIVIL/ZESTRIL) 5 MG tablet, Take 1 tablet (5 mg) by mouth daily (Patient not taking: Reported on 4/3/2018), Disp: 30 tablet, Rfl: 0  Past Medical History:   Diagnosis Date     Hyperlipidemia      Hypertension      Seizures (H)     [facility]     Sleep apnea      Office Visit on 04/03/2018   Component Date Value Ref Range Status     Bilirubin Direct 04/03/2018 <0.1  0.0 - 0.2 mg/dL Final     Bilirubin Total 04/03/2018 0.3  0.2 - 1.3 mg/dL Final     Albumin 04/03/2018 3.9  3.4 - 5.0 g/dL Final     Protein Total 04/03/2018 7.3  6.8 - 8.8 g/dL Final     Alkaline Phosphatase 04/03/2018 54  40 - 150 U/L Final     ALT 04/03/2018 60  0 - 70 U/L Final     AST 04/03/2018 42  0 - 45 U/L Final      Reviewed health maintenance  Patient Active Problem List   Diagnosis     Seizures (H)     Essential hypertension     Sleep apnea, unspecified type     Hyperlipidemia, unspecified hyperlipidemia type     BMI 50.0-59.9, adult (H)     Skin tag         OBJECTIVE:  no apparent distress  /88  Pulse 93  Temp 98.7  F (37.1  C) (Oral)  Resp 16  Ht 5' 9.5\" (1.765 m)  Wt (!) 361 lb (163.7 kg)  SpO2 96%  BMI 52.55 kg/m2     Head: Normocephalic. No masses, lesions, tenderness or abnormalities.  Neck::Neck supple. No adenopathy. Thyroid symmetric, normal size.    Cardiovascular: negative. No lifts, heaves, or thrills. RRR. No murmurs, clicks gallops or rubs  Respiratory. Good diaphragmatic excursion. Lungs clear  Gastrointestinal:Abdomen soft, non-tender.  No masses, organomegaly    Office Visit on 04/03/2018   Component Date Value Ref Range Status     Bilirubin Direct 04/03/2018 <0.1  0.0 - 0.2 mg/dL Final     Bilirubin Total 04/03/2018 0.3  0.2 - " "1.3 mg/dL Final     Albumin 04/03/2018 3.9  3.4 - 5.0 g/dL Final     Protein Total 04/03/2018 7.3  6.8 - 8.8 g/dL Final     Alkaline Phosphatase 04/03/2018 54  40 - 150 U/L Final     ALT 04/03/2018 60  0 - 70 U/L Final     AST 04/03/2018 42  0 - 45 U/L Final         ICD-10-CM    1. Routine general medical examination at a health care facility Z00.00    2. Essential hypertension I10 hydrochlorothiazide (HYDRODIURIL) 25 MG tablet     lisinopril (PRINIVIL/ZESTRIL) 10 MG tablet    PLAN: Follow up in 6 months         Reviewed and updated as needed this visit by clinical staff  Tobacco  Allergies  Meds  Med Hx  Surg Hx  Fam Hx  Soc Hx        Reviewed and updated as needed this visit by Provider            Review of Systems   Neurological: Positive for headaches.     C: NEGATIVE for fever, chills, change in weight  I: NEGATIVE for worrisome rashes, moles or lesions  E: NEGATIVE for vision changes or irritation  ENT: NEGATIVE for ear, mouth and throat problems  R: NEGATIVE for significant cough or SOB  CV: NEGATIVE for chest pain, palpitations or peripheral edema  GI: NEGATIVE for nausea, abdominal pain, heartburn, or change in bowel habits   male: negative for dysuria, hematuria, decreased urinary stream, erectile dysfunction, urethral discharge  M: NEGATIVE for significant arthralgias or myalgia  N: NEGATIVE for weakness, dizziness or paresthesias  P: NEGATIVE for changes in mood or affect    OBJECTIVE:   /88  Pulse 93  Temp 98.7  F (37.1  C) (Oral)  Resp 16  Ht 5' 9.5\" (1.765 m)  Wt (!) 361 lb (163.7 kg)  SpO2 96%  BMI 52.55 kg/m2    Physical Exam  GENERAL: healthy, alert and no distress morbidly obese  EYES: Eyes grossly normal to inspection, PERRL and conjunctivae and sclerae normal  HENT: ear canals and TM's normal, nose and mouth without ulcers or lesions  NECK: no adenopathy, no asymmetry, masses, or scars and thyroid normal to palpation  RESP: lungs clear to auscultation - no rales, rhonchi or " "wheezes  CV: regular rate and rhythm, normal S1 S2, no S3 or S4, no murmur, click or rub, no peripheral edema and peripheral pulses strong  ABDOMEN: soft, nontender, no hepatosplenomegaly, no masses and bowel sounds normal  MS: no gross musculoskeletal defects noted, no edema  SKIN: no suspicious lesions or rashes  NEURO: Normal strength and tone, mentation intact and speech normal  PSYCH: mentation appears normal, affect normal/bright    ASSESSMENT/PLAN:       ICD-10-CM    1. Routine general medical examination at a health care facility Z00.00    2. Essential hypertension I10 hydrochlorothiazide (HYDRODIURIL) 25 MG tablet     lisinopril (PRINIVIL/ZESTRIL) 10 MG tablet   3. BMI 50.0-59.9, adult (H) Z68.43    4. Sleep apnea, unspecified type G47.30      Keep diary of headaches  COUNSELING:   Reviewed preventive health counseling, as reflected in patient instructions       Regular exercise       Healthy diet/nutrition         reports that he has never smoked. He has never used smokeless tobacco.    Estimated body mass index is 52.55 kg/(m^2) as calculated from the following:    Height as of this encounter: 5' 9.5\" (1.765 m).    Weight as of this encounter: 361 lb (163.7 kg).   Weight management plan: Dr Salomon    Counseling Resources:  ATP IV Guidelines  Pooled Cohorts Equation Calculator  FRAX Risk Assessment  ICSI Preventive Guidelines  Dietary Guidelines for Americans, 2010  USDA's MyPlate  ASA Prophylaxis  Lung CA Screening    José Antonio Malhotra MD  [facility]  Answers for HPI/ROS submitted by the patient on 5/9/2018   PHQ-2 Score: 0    "

## 2018-05-09 NOTE — MR AVS SNAPSHOT
After Visit Summary   5/9/2018    Jovi Kenney    MRN: [id number]           Patient Information     Date Of Birth          1970        Visit Information        Provider Department      5/9/2018 3:15 PM José Antonio Malhotra MD [facility]        Today's Diagnoses     Routine general medical examination at a health care facility    -  1    Essential hypertension        BMI 50.0-59.9, adult (H)        Sleep apnea, unspecified type          Care Instructions      Preventive Health Recommendations  Male Ages 40 to 49    Yearly exam:             See your health care provider every year in order to  o   Review health changes.   o   Discuss preventive care.    o   Review your medicines if your doctor has prescribed any.    You should be tested each year for STDs (sexually transmitted diseases) if you re at risk.     Have a cholesterol test every 5 years.     Have a colonoscopy (test for colon cancer) if someone in your family has had colon cancer or polyps before age 50.     After age 45, have a diabetes test (fasting glucose). If you are at risk for diabetes, you should have this test every 3 years.      Talk with your health care provider about whether or not a prostate cancer screening test (PSA) is right for you.    Shots: Get a flu shot each year. Get a tetanus shot every 10 years.     Nutrition:    Eat at least 5 servings of fruits and vegetables daily.     Eat whole-grain bread, whole-wheat pasta and brown rice instead of white grains and rice.     Talk to your provider about Calcium and Vitamin D.     Lifestyle    Exercise for at least 150 minutes a week (30 minutes a day, 5 days a week). This will help you control your weight and prevent disease.     Limit alcohol to one drink per day.     No smoking.     Wear sunscreen to prevent skin cancer.     See your dentist every six months for an exam and cleaning.              Follow-ups after your visit        Future tests that were ordered  "for you today     Open Future Orders        Priority Expected Expires Ordered    Comprehensive metabolic panel (BMP + Alb, Alk Phos, ALT, AST, Total. Bili, TP) Routine  5/9/2019 5/9/2018    Lipid panel reflex to direct LDL Fasting Routine  5/9/2019 5/9/2018            Who to contact     If you have questions or need follow up information about today's clinic visit or your schedule please contact [facility] directly at [phone].  Normal or non-critical lab and imaging results will be communicated to you by iubendahart, letter or phone within 4 business days after the clinic has received the results. If you do not hear from us within 7 days, please contact the clinic through Ivaluat or phone. If you have a critical or abnormal lab result, we will notify you by phone as soon as possible.  Submit refill requests through MyColorScreen or call your pharmacy and they will forward the refill request to us. Please allow 3 business days for your refill to be completed.          Additional Information About Your Visit        iubendaharSungevity Information     MyColorScreen gives you secure access to your electronic health record. If you see a primary care provider, you can also send messages to your care team and make appointments. If you have questions, please call your primary care clinic.  If you do not have a primary care provider, please call [phone] and they will assist you.        Care EveryWhere ID     This is your Care EveryWhere ID. This could be used by other organizations to access your [facility] medical records  [id number]        Your Vitals Were     Pulse Temperature Respirations Height Pulse Oximetry BMI (Body Mass Index)    93 98.7  F (37.1  C) (Oral) 16 5' 9.5\" (1.765 m) 96% 52.55 kg/m2       Blood Pressure from Last 3 Encounters:   05/09/18 130/88   04/03/18 (!) 162/99   04/05/17 126/83    Weight from Last 3 Encounters:   05/09/18 (!) 361 lb (163.7 kg)   04/03/18 (!) 367 lb (166.5 kg)   04/05/17 (!) 350 lb " (158.8 kg)                 Today's Medication Changes          These changes are accurate as of 5/9/18  3:40 PM.  If you have any questions, ask your nurse or doctor.               These medicines have changed or have updated prescriptions.        Dose/Directions    lisinopril 10 MG tablet   Commonly known as:  PRINIVIL/ZESTRIL   This may have changed:    - medication strength  - how much to take   Used for:  Essential hypertension   Changed by:  José Antonio Malhotra MD        Dose:  10 mg   Take 1 tablet (10 mg) by mouth daily   Quantity:  90 tablet   Refills:  1            Where to get your medicines      These medications were sent to [id number]  [place], [place] - [address]  [address], [place] [address]     Phone:  [phone]     hydrochlorothiazide 25 MG tablet    lisinopril 10 MG tablet                Primary Care Provider Office Phone # Fax #    [facility] [phone] [address] [address]        Equal Access to Services     [facility] Center[facility] AH: Hadii aad ku hadasho Soomaali, waaxda luqadaha, qaybta kaalmada adeegyada, waxay idiin hayaan adeeg kharash la'janice . So [facility] [phone].    ATENCIÓN: Si habla español, tiene a bush disposición servicios gratuitos de asistencia lingüística. [facility] [phone].    We comply with applicable federal civil rights laws and [place] laws. We do not discriminate on the basis of race, color, national origin, age, disability, sex, sexual orientation, or gender identity.            Thank you!     Thank you for choosing [facility]  for your [facility]. Our goal is always to provide you with excellent care. Hearing back from our patients is one way we can continue to improve our services. Please take a few minutes to complete the written survey that you may receive in the mail after your visit with us. Thank you!             Your Updated Medication List - Protect others around you: Learn how to  safely use, store and throw away your medicines at www.disposemymeds.org.          This list is accurate as of 5/9/18  3:40 PM.  Always use your most recent med list.                   Brand Name Dispense Instructions for use Diagnosis    ALLEGRA PO           hydrochlorothiazide 25 MG tablet    HYDRODIURIL    90 tablet    Take 1 tablet (25 mg) by mouth daily    Essential hypertension       lisinopril 10 MG tablet    PRINIVIL/ZESTRIL    90 tablet    Take 1 tablet (10 mg) by mouth daily    Essential hypertension       terbinafine 250 MG tablet    lamISIL    30 tablet    Take 1 tablet (250 mg) by mouth daily    Dermatophytosis of nail

## 2018-06-08 DIAGNOSIS — I10 ESSENTIAL HYPERTENSION: ICD-10-CM

## 2018-06-08 LAB
ALBUMIN SERPL-MCNC: 4.1 G/DL (ref 3.4–5)
ALP SERPL-CCNC: 52 U/L (ref 40–150)
ALT SERPL W P-5'-P-CCNC: 63 U/L (ref 0–70)
ANION GAP SERPL CALCULATED.3IONS-SCNC: 8 MMOL/L (ref 3–14)
AST SERPL W P-5'-P-CCNC: 41 U/L (ref 0–45)
BILIRUB SERPL-MCNC: 0.5 MG/DL (ref 0.2–1.3)
BUN SERPL-MCNC: 16 MG/DL (ref 7–30)
CALCIUM SERPL-MCNC: 8.9 MG/DL (ref 8.5–10.1)
CHLORIDE SERPL-SCNC: 104 MMOL/L (ref 94–109)
CHOLEST SERPL-MCNC: 190 MG/DL
CO2 SERPL-SCNC: 26 MMOL/L (ref 20–32)
CREAT SERPL-MCNC: 1.09 MG/DL (ref 0.66–1.25)
GFR SERPL CREATININE-BSD FRML MDRD: 72 ML/MIN/1.7M2
GLUCOSE SERPL-MCNC: 97 MG/DL (ref 70–99)
HDLC SERPL-MCNC: 29 MG/DL
LDLC SERPL CALC-MCNC: 95 MG/DL
NONHDLC SERPL-MCNC: 161 MG/DL
POTASSIUM SERPL-SCNC: 3.7 MMOL/L (ref 3.4–5.3)
PROT SERPL-MCNC: 7.4 G/DL (ref 6.8–8.8)
SODIUM SERPL-SCNC: 138 MMOL/L (ref 133–144)
TRIGL SERPL-MCNC: 328 MG/DL

## 2018-06-08 PROCEDURE — 80061 LIPID PANEL: CPT | Performed by: FAMILY MEDICINE

## 2018-06-08 PROCEDURE — 80053 COMPREHEN METABOLIC PANEL: CPT | Performed by: FAMILY MEDICINE

## 2018-06-08 PROCEDURE — 36415 COLL VENOUS BLD VENIPUNCTURE: CPT | Performed by: FAMILY MEDICINE

## 2018-08-01 NOTE — PROGRESS NOTES
"  SUBJECTIVE:   Jovi Kenney is a 48 year old male who presents to clinic today for the following health issues:  {Provider please address medication reconciliation discrepancies--rooming staff please delete if no med/rec issues}    {Superlists:608763}    {additional problems for provider to add:731843}    Problem list and histories reviewed & adjusted, as indicated.  Additional history: {NONE - AS DOCUMENTED:211925::\"as documented\"}    {HIST REVIEW/ LINKS 2:023214}    Reviewed and updated as needed this visit by clinical staff       Reviewed and updated as needed this visit by Provider         {PROVIDER CHARTING PREFERENCE:019586}    "

## 2018-10-17 ENCOUNTER — OFFICE VISIT (OUTPATIENT)
Dept: INTERNAL MEDICINE | Facility: CLINIC | Age: 48
End: 2018-10-17
Payer: COMMERCIAL

## 2018-10-17 VITALS
WEIGHT: 315 LBS | DIASTOLIC BLOOD PRESSURE: 80 MMHG | BODY MASS INDEX: 46.65 KG/M2 | HEART RATE: 117 BPM | HEIGHT: 69 IN | SYSTOLIC BLOOD PRESSURE: 126 MMHG | TEMPERATURE: 98.9 F | RESPIRATION RATE: 22 BRPM | OXYGEN SATURATION: 96 %

## 2018-10-17 DIAGNOSIS — E66.01 MORBID OBESITY WITH BMI OF 50.0-59.9, ADULT (H): Primary | ICD-10-CM

## 2018-10-17 DIAGNOSIS — R56.9 SEIZURES (H): ICD-10-CM

## 2018-10-17 DIAGNOSIS — I10 ESSENTIAL HYPERTENSION: ICD-10-CM

## 2018-10-17 DIAGNOSIS — E78.5 HYPERLIPIDEMIA, UNSPECIFIED HYPERLIPIDEMIA TYPE: ICD-10-CM

## 2018-10-17 DIAGNOSIS — L83 ACANTHOSIS NIGRICANS, ACQUIRED: ICD-10-CM

## 2018-10-17 DIAGNOSIS — R00.2 HEART PALPITATIONS: ICD-10-CM

## 2018-10-17 DIAGNOSIS — G47.30 SLEEP APNEA, UNSPECIFIED TYPE: ICD-10-CM

## 2018-10-17 DIAGNOSIS — R06.09 DYSPNEA ON EXERTION: ICD-10-CM

## 2018-10-17 LAB
HBA1C MFR BLD: 5 % (ref 0–5.6)
T4 FREE SERPL-MCNC: 0.93 NG/DL (ref 0.76–1.46)
TSH SERPL DL<=0.005 MIU/L-ACNC: 4.06 MU/L (ref 0.4–4)

## 2018-10-17 PROCEDURE — 84439 ASSAY OF FREE THYROXINE: CPT | Performed by: INTERNAL MEDICINE

## 2018-10-17 PROCEDURE — 99215 OFFICE O/P EST HI 40 MIN: CPT | Performed by: INTERNAL MEDICINE

## 2018-10-17 PROCEDURE — 84443 ASSAY THYROID STIM HORMONE: CPT | Performed by: INTERNAL MEDICINE

## 2018-10-17 PROCEDURE — 93000 ELECTROCARDIOGRAM COMPLETE: CPT | Performed by: INTERNAL MEDICINE

## 2018-10-17 PROCEDURE — 83036 HEMOGLOBIN GLYCOSYLATED A1C: CPT | Performed by: INTERNAL MEDICINE

## 2018-10-17 PROCEDURE — 36415 COLL VENOUS BLD VENIPUNCTURE: CPT | Performed by: INTERNAL MEDICINE

## 2018-10-17 NOTE — PROGRESS NOTES
"3 month sugar average is actually low.  We call this a \"honeymoon phase\" before blood sugars start to increase.  The weight loss will be imperative in preventing diabetes so I am glad you come in today. I am confident we will be able to make progress towards your goals.    The rest of your labs are still pending.     Dr. Salomon  "

## 2018-10-17 NOTE — PATIENT INSTRUCTIONS
Consider Ways to Wellness and/or nutritionist visit with your spouse.   Nutritionists: Jennifer Jamison, Rani Nieves, Leah Sotomayor.    To start you should work on cutting out soda and sugary beverages, and work on decreasing the amount of eating out you are doing.     Start with the Plate Method form of eating.    Look into a gym membership, or other kind of strength building program. Until you are able to get the stress test, keep cardiovascular exercise to moderate walking, and start with some strength training.     For your stress test: No food 3 hours prior.  Water is ok     No smoking for 6 hours prior.     No caffeine for 12 hours prior (chocolate, Anican, Excedrin, coffee, tea)     Wear comfortable clothing.    Consideration for liraglutide injections, depending on your lab results.       [facility]    If you have any questions regarding to your visit please contact your care team:     Team Pink:   Clinic Hours Telephone Number   Internal Medicine:  Dr. Yue Heaton, NP 7am-7pm  Monday - Thursday   7am-5pm  Fridays  [phone]  (Appointment scheduling available 24/7)   Urgent Care - [place] - 11am-9pm Monday-Friday Saturday-Sunday- 9am-5pm   [place] - 5pm-9pm Monday-Friday Saturday-Sunday- 9am-5pm  [phone] - [place]  [phone] - [place]       What options do I have for a visit other than an office visit? We offer electronic visits (e-visits) and telephone visits, when medically appropriate.  Please check with your medical insurance to see if these types of visits are covered, as you will be responsible for any charges that are not paid by your insurance.      You can use JobFlash (secure electronic communication) to access to your chart, send your primary care provider a message, or make an appointment. Ask a team member how to get started.     For a price quote for your services, please call our Consumer Price  Line at [phone] or our Imaging Cost estimation line at [phone] (for imaging tests).  Francy EDLGADILLO MA

## 2018-10-17 NOTE — LETTER
"                                                     [facility]  [address] [place] [place] [address]    October 18, 2018    Jovi Kenney  [address]  [place] [address]          Dear Jovi,    3 month sugar average is actually low.  We call this a \"honeymoon phase\" before blood sugars start to increase.  The weight loss will be imperative in preventing diabetes so I am glad you come in today. I am confident we will be able to make progress towards your goals.    The rest of your labs are still pending    Enclosed is a copy of your results.     Results for orders placed or performed in visit on 10/17/18   Hemoglobin A1c   Result Value Ref Range    Hemoglobin A1C 5.0 0 - 5.6 %   TSH with free T4 reflex   Result Value Ref Range    TSH 4.06 (H) 0.40 - 4.00 mU/L   T4 free   Result Value Ref Range    T4 Free 0.93 0.76 - 1.46 ng/dL       If you have any questions or concerns, please call myself or my nurse at [phone].      Sincerely,        Yue Salomon MD/jeremias  "

## 2018-10-17 NOTE — MR AVS SNAPSHOT
After Visit Summary   10/17/2018    Jovi Kenney    MRN: [id number]           Patient Information     Date Of Birth          1970        Visit Information        Provider Department      10/17/2018 11:30 AM Yue Salomon MD [facility]        Today's Diagnoses     BMI 50.0-59.9, adult (H)    -  1    Essential hypertension        Sleep apnea, unspecified type        Hyperlipidemia, unspecified hyperlipidemia type        Acanthosis nigricans, acquired        Heart palpitations        Dyspnea on exertion          Care Instructions    Consider Ways to Wellness and/or nutritionist visit with your spouse.   Nutritionists: Rani Daley, Leah Sotomayor.    To start you should work on cutting out soda and sugary beverages, and work on decreasing the amount of eating out you are doing.     Start with the Plate Method form of eating.    Look into a gym membership, or other kind of strength building program. Until you are able to get the stress test, keep cardiovascular exercise to moderate walking, and start with some strength training.     For your stress test: No food 3 hours prior.  Water is ok     No smoking for 6 hours prior.     No caffeine for 12 hours prior (chocolate, Anican, Excedrin, coffee, tea)     Wear comfortable clothing.    Consideration for liraglutide injections, depending on your lab results.       [facility]    If you have any questions regarding to your visit please contact your care team:     Team Pink:   Clinic Hours Telephone Number   Internal Medicine:  Dr. Yue Heaton NP 7am-7pm  Monday - Thursday   7am-5pm  Fridays  [phone]  (Appointment scheduling available 24/7)   Urgent Care - [place] - 11am-9pm Monday-Friday Saturday-Sunday- 9am-5pm   [place] - 5pm-9pm Monday-Friday Saturday-Sunday- 9am-5pm  [phone] - [place]  [phone] - [place]       What  options do I have for a visit other than an office visit? We offer electronic visits (e-visits) and telephone visits, when medically appropriate.  Please check with your medical insurance to see if these types of visits are covered, as you will be responsible for any charges that are not paid by your insurance.      You can use [employer] (secure electronic communication) to access to your chart, send your primary care provider a message, or make an appointment. Ask a team member how to get started.     For a price quote for your services, please call our Consumer Price Line at [phone] or our Imaging Cost estimation line at [phone] (for imaging tests).  Francy DELGADILLO MA            Follow-ups after your visit        Additional Services     NUTRITION REFERRAL       Your provider has referred you to: G: Logan Barfield [phone]   [email or web address]    Please be aware that coverage of these services is subject to the terms and limitations of your health insurance plan.  Call member services at your health plan with any benefit or coverage questions.      Please bring the following with you to your appointment:    (1) This referral request  (2) Any documents given to you regarding this referral  (3) Any specific questions you have about diet and/or food choices                  Follow-up notes from your care team     Return in about 5 weeks (around 11/21/2018) for weight management.      Future tests that were ordered for you today     Open Future Orders        Priority Expected Expires Ordered    Exercise Stress Echocardiogram Routine  10/17/2019 10/17/2018            Who to contact     If you have questions or need follow up information about today's clinic visit or your schedule please contact [facility] [place] directly at [phone].  Normal or non-critical lab and imaging results will be communicated to you by MyChart, letter or phone within 4 business  "days after the clinic has received the results. If you do not hear from us within 7 days, please contact the clinic through [employer] or phone. If you have a critical or abnormal lab result, we will notify you by phone as soon as possible.  Submit refill requests through [employer] or call your pharmacy and they will forward the refill request to us. Please allow 3 business days for your refill to be completed.          Additional Information About Your Visit        [employer] Information     [employer] gives you secure access to your electronic health record. If you see a primary care provider, you can also send messages to your care team and make appointments. If you have questions, please call your primary care clinic.  If you do not have a primary care provider, please call [phone] and they will assist you.        Care EveryWhere ID     This is your Care EveryWhere ID. This could be used by other organizations to access your [facility] medical records  [id number]        Your Vitals Were     Pulse Temperature Respirations Height Pulse Oximetry BMI (Body Mass Index)    117 98.9  F (37.2  C) (Oral) 22 5' 9\" (1.753 m) 96% 52.87 kg/m2       Blood Pressure from Last 3 Encounters:   10/17/18 126/80   05/09/18 130/88   04/03/18 (!) 162/99    Weight from Last 3 Encounters:   10/17/18 (!) 358 lb (162.4 kg)   05/09/18 (!) 361 lb (163.7 kg)   04/03/18 (!) 367 lb (166.5 kg)              We Performed the Following     EKG 12-lead complete w/read - Clinics     Hemoglobin A1c     NUTRITION REFERRAL     TSH with free T4 reflex        Primary Care Provider Office Phone # Fax #    [facility] [phone]       [address] [address] [address]        Equal Access to Services     RONDA GHOSH : Tequila Her, wamakenna herrera, qaybta kaalmada vivek, ernesto bhandari. So [facility] [phone].    ATENCIÓN: Si habla español, tiene a bush disposición servicios gratuitos " de asistencia lingüística. Rocky al [phone].    We comply with applicable federal civil rights laws and [place] laws. We do not discriminate on the basis of race, color, national origin, age, disability, sex, sexual orientation, or gender identity.            Thank you!     Thank you for choosing [facility]  for your care. Our goal is always to provide you with excellent care. Hearing back from our patients is one way we can continue to improve our services. Please take a few minutes to complete the written survey that you may receive in the mail after your visit with us. Thank you!             Your Updated Medication List - Protect others around you: Learn how to safely use, store and throw away your medicines at www.disposemymeds.org.          This list is accurate as of 10/17/18 12:29 PM.  Always use your most recent med list.                   Brand Name Dispense Instructions for use Diagnosis    ALLEGRA PO           hydrochlorothiazide 25 MG tablet    HYDRODIURIL    90 tablet    Take 1 tablet (25 mg) by mouth daily    Essential hypertension       lisinopril 10 MG tablet    PRINIVIL/ZESTRIL    90 tablet    Take 1 tablet (10 mg) by mouth daily    Essential hypertension

## 2018-10-17 NOTE — PROGRESS NOTES
INTERNAL MEDICINE  Medical Weight Loss - Initial Evaluation      Primary Care Physician: Logan Solomon    Chief Complaint:   Chief Complaint   Patient presents with     Weight Problem     consult.       HISTORY OF PRESENT ILLNESS (HPI):    Patient is 48 year old year old male who is here for medical weight loss initial evaluation.       Weight history- His weight has been increasing for a long time, but in the last five years it has been increasing more rapidly. He has four children and a wife, but none of them are actively trying to lose weight, though he wants to set a good example for them.     Health History- He had untreated sleep apnea from about 7 years ago until about 3 years ago when he got a CPAP. Before he got his CPAP, he found that he had no energy, and was very depressed, also dealing with the death of his mother during that time. Hyperlipidemia and hypertension have been other ongoing problems. In high school he had a few seizures, he was on dilantin and tegretol for about 10 years total, after that he stopped the medication and hasn't had any seizures since.     Diet-   Breakfast- Toast with butter and peanut butter, maybe jelly  Lunch- apple, carrots, chips, sandwich, cheese, 2-3 cookies, or dinner left overs  Dinner- often fast food or restaurant  Late Night- Rare snacks  Snacks- Usually eat part of lunch, possibly drink 36 oz of soda per day.       He desires to lose weight to Get healthier, Improve self worth, reverse or improve sleep apnea, and prevent diabetes from developing.    Lowest adult weight was 220 lbs.  Highest adult weight was 370 lbs.   Patient feels his goal weight is 200-220 lbs.     Previous weight loss efforts: Exercise In his 20's he was able to lose 50 pounds with diet and exercise.     Exercise Habits: Patient is currently exercising.  Current exercise includes:  walking. Time spent: 15-45 minutes, 3 times per week. In the winter he goes walking indoors. He  previously would bike and play tennis.     Patient has not had weight loss surgery.  Patient has not considered weight loss surgery.   Patient has not considered weight loss medication. He is hesitant to consider a medication as he has an aunt who suffered major complications from taking weight loss medications.  Patient has not been on weight loss medication       OBESITY RELATED COMORBIDITIES:   hypertension, hyperlipidemia and sleep apnea    PAST SURGICAL HISTORY:   Past Surgical History:   Procedure Laterality Date     tonsils and adnoids         PAST MEDICAL HISTORY:  Past Medical History:   Diagnosis Date     Hyperlipidemia      Hypertension      Seizures (H)     last was in [facility]     Sleep apnea        MEDICATIONS:   Current Outpatient Prescriptions   Medication Sig Dispense Refill     Fexofenadine HCl (ALLEGRA PO)        hydrochlorothiazide (HYDRODIURIL) 25 MG tablet Take 1 tablet (25 mg) by mouth daily 90 tablet 1     lisinopril (PRINIVIL/ZESTRIL) 10 MG tablet Take 1 tablet (10 mg) by mouth daily 90 tablet 1       ALLERGIES:   No Known Allergies    SOCIAL HISTORY:   Social History     Social History     Marital status:      Spouse name: N/A     Number of children: N/A     Years of education: N/A     Occupational History     Not on file.     Social History Main Topics     Smoking status: Never Smoker     Smokeless tobacco: Never Used     Alcohol use Yes      Comment: less than once a month      Drug use: No     Sexual activity: Yes     Partners: Female     Other Topics Concern     Not on file     Social History Narrative       FAMILY HISTORY:   Family History   Problem Relation Age of Onset     Bronchitis Mother      Hypertension Mother        REVIEW OF SYSTEMS:   ROS: 10 point ROS neg other than the symptoms noted above in the HPI. When he exercises he does feel a rather profound heart racing and short of breath that he feels is likely more than he should typically be feeling. No chest pain.   "Resolves after rest.      This document serves as a record of the services and decisions personally performed and made by Yue Salomon MD. It was created on her behalf by Rama Oliver, a trained medical scribe. The creation of this document is based the provider's statements to the medical scribe.  Rama Oliver, October 17, 2018 11:54 AM     PHYSICAL EXAMINATION:    VITALS: /80  Pulse 117  Temp 98.9  F (37.2  C) (Oral)  Resp 22  Ht 1.753 m (5' 9\")  Wt (!) 162.4 kg (358 lb)  SpO2 96%  BMI 52.87 kg/m2  GENERAL: Patient is a 48 year old year old male is in no acute distress.  Patient is alert and orientated x 4, pleasant and cooperative with exam. Mood and affect are appropriate.  HEENT:  Head is normocephalic, nontraumatic. Oropharynx crowded  NECK: is supple without lymphadenopathy, thyroidmegaly, or mass.  Trachea midline.   CARDIOVASCULAR:  Regular rate and rhythm without murmurs, rubs, or gallops.  RESPIRATORY:  Lungs are clear to auscultation bilaterally, respiratory effort is normal.   GASTROINTESTINAL:  Abdomen is obese, soft, nontender, without obvious organomegaly or masses.  Positive bowel sounds throughout. No bruits. Umbilical hernia noted. Normal abdominal striae  LOWER EXTREMITIES:  Trace pitting edema bilaterally, no cyanosis, ulceration, or chronic venous stasis noted.  NEUROLOGIC:   Gait appears normal  SKIN:  Acanthosis nigrans noted on neck  1+ posterior tibial pulses bilateral      LAB RESULTS:   Orders Only on 06/08/2018   Component Date Value Ref Range Status     Sodium 06/08/2018 138  133 - 144 mmol/L Final     Potassium 06/08/2018 3.7  3.4 - 5.3 mmol/L Final     Chloride 06/08/2018 104  94 - 109 mmol/L Final     Carbon Dioxide 06/08/2018 26  20 - 32 mmol/L Final     Anion Gap 06/08/2018 8  3 - 14 mmol/L Final     Glucose 06/08/2018 97  70 - 99 mg/dL Final    Fasting specimen     Urea Nitrogen 06/08/2018 16  7 - 30 mg/dL Final     Creatinine 06/08/2018 1.09  0.66 - 1.25 mg/dL Final     " GFR Estimate 06/08/2018 72  >60 mL/min/1.7m2 Final    Non  GFR Calc     GFR Estimate If Black 06/08/2018 87  >60 mL/min/1.7m2 Final    African American GFR Calc     Calcium 06/08/2018 8.9  8.5 - 10.1 mg/dL Final     Bilirubin Total 06/08/2018 0.5  0.2 - 1.3 mg/dL Final     Albumin 06/08/2018 4.1  3.4 - 5.0 g/dL Final     Protein Total 06/08/2018 7.4  6.8 - 8.8 g/dL Final     Alkaline Phosphatase 06/08/2018 52  40 - 150 U/L Final     ALT 06/08/2018 63  0 - 70 U/L Final     AST 06/08/2018 41  0 - 45 U/L Final     Cholesterol 06/08/2018 190  <200 mg/dL Final     Triglycerides 06/08/2018 328* <150 mg/dL Final    Comment: Borderline high:  150-199 mg/dl  High:             200-499 mg/dl  Very high:       >499 mg/dl  Fasting specimen       HDL Cholesterol 06/08/2018 29* >39 mg/dL Final     LDL Cholesterol Calculated 06/08/2018 95  <100 mg/dL Final    Desirable:       <100 mg/dl     Non HDL Cholesterol 06/08/2018 161* <130 mg/dL Final    Comment: Above Desirable:  130-159 mg/dl  Borderline high:  160-189 mg/dl  High:             190-219 mg/dl  Very high:       >219 mg/dl         ASSESSMENT/PLAN:      1. Essential hypertension  Better control will come with weight loss, will check a TSH lab today to rule out other causes of weight gain. See 4 below for weight loss plan.   - TSH with free T4 reflex  - NUTRITION REFERRAL    2. Sleep apnea, unspecified type  Patient currently being treated with CPAP, patient reports good adherence.     3. Hyperlipidemia, unspecified hyperlipidemia type  Should improve with diet changes and weight loss, see 4 for plan.   - NUTRITION REFERRAL    4. BMI 50.0-59.9, adult (H)  Discussed weight loss plan, as reflected below and in patient instructions. He should start with seeing a nutritionist, consider starting a GLP-1 agonist injectable. Discuss dietary changes to make to start, including the plate method, stop drinking sugary beverages, and reduce amount of times eating out/fast  "food. Advised that he shouldn't completely rule out weight loss surgery, but it may be one of the final options if he fails these more conservative treatments.     He cannot have stimulants or Belviq due to risk of cv disease.  He cannot have Contrave due to seizure history.  - NUTRITION REFERRAL    5. Acanthosis nigricans, acquired  Advised that this is a possible symptom of insulin resistance developing, and will want to check an A1c today to see what direction he is in with regards to developing diabetes  - Hemoglobin A1c    6. Heart palpitations  Patient is expressing concern that it is safe for him to do cardiovascular exercise as when he gets his heart rate up he feels pounding in his chest, and some SOB. His last stress test was some time ago, and is okay to repeat a stress test before being cleared for cardiovascular exercise. Cannot rule out anginal equivalent.   - EKG 12-lead complete w/read - Clinics  - Exercise Stress Echocardiogram; Future    7. Dyspnea on exertion  As above.   - EKG 12-lead complete w/read - Clinics  - Exercise Stress Echocardiogram; Future    Seizures - avoid bupropion containing products.      Patient will begin medical weight loss program. We discussed the need for lifelong dietary changes and a regular exercise program in order to lose weight and in order to maintain weight loss long-term. We discussed a realistic weight goal which is  5-10% of the patients initial weight which has been shown to reduce medical risks and improve overall health. Additional goals will be make thereafter.      Referred to nutritionist    We discussed the role of pharmacological agents in the treatment of obesity and the \"off-label\" use of medications in this practice. Discussed that medications must always be used together with lifestyle changes such as improvements in diet choices, portion control and establishing and maintaining a regular exercise program.     All of patients questions about the " weight loss program were answered fully.     I spent 35 minutes of time with the patient and >50% of it was in education and counseling regarding medical weight loss.    Patient Instructions     Consider Ways to Wellness and/or nutritionist visit with your spouse.   Nutritionists: Rani Daley, Leah Sotomayor.    To start you should work on cutting out soda and sugary beverages, and work on decreasing the amount of eating out you are doing.     Start with the Plate Method form of eating.    Look into a gym membership, or other kind of strength building program. Until you are able to get the stress test, keep cardiovascular exercise to moderate walking, and start with some strength training.     For your stress test: No food 3 hours prior.  Water is ok     No smoking for 6 hours prior.     No caffeine for 12 hours prior (chocolate, Anican, Excedrin, coffee, tea)     Wear comfortable clothing.    Consideration for liraglutide injections, depending on your lab results.       [facility]    If you have any questions regarding to your visit please contact your care team:     Team Pink:   Clinic Hours Telephone Number   Internal Medicine:  Dr. Yue Heaton NP 7am-7pm  Monday - Thursday   7am-5pm  Fridays  [phone]  (Appointment scheduling available 24/7)   [facility] - 11am-9pm Monday-Friday Saturday-Sunday- 9am-5pm   [place] - 5pm-9pm Monday-Friday Saturday-Sunday- 9am-5pm  [phone] - [place]  [phone] - [place]       What options do I have for a visit other than an office visit? We offer electronic visits (e-visits) and telephone visits, when medically appropriate.  Please check with your medical insurance to see if these types of visits are covered, as you will be responsible for any charges that are not paid by your insurance.      You can use [employer] (secure electronic communication) to access  to your chart, send your primary care provider a message, or make an appointment. Ask a team member how to get started.     For a price quote for your services, please call our Consumer Price Line at [phone] or our Imaging Cost estimation line at [phone] (for imaging tests).  Francy DELGADILLO MA        The information in this document, created by the medical scribe, Rama Oliver, for me, accurately reflects the services I personally performed and the decisions made by me. I have reviewed and approved this document for accuracy.     Yue Salomon MD  Internal Medicine    American Board of Obesity Medicine Diplomate

## 2018-10-17 NOTE — Clinical Note
Binh Granados.  He really needs an overhaul of diet and exercise - 30 ounces of soda daily and eats out fast food twice daily.  Joo.

## 2018-10-17 NOTE — LETTER
[place] [id number] [place] [address]    October 18, 2018    Jovi Kenney  [address]  [place] [address]          Dear Jovi,    EKG is ok for the stress test    Enclosed is a copy of your results.     Results for orders placed or performed in visit on 10/17/18   Hemoglobin A1c   Result Value Ref Range    Hemoglobin A1C 5.0 0 - 5.6 %   TSH with free T4 reflex   Result Value Ref Range    TSH 4.06 (H) 0.40 - 4.00 mU/L   T4 free   Result Value Ref Range    T4 Free 0.93 0.76 - 1.46 ng/dL       If you have any questions or concerns, please call myself or my nurse at [phone].      Sincerely,        Yue Salomon MD/jeremias

## 2018-10-18 NOTE — PROGRESS NOTES
One of thyroid tests is slightly off but the other is normal.  There is no need for treatment as this will reverse with weight loss.   Dr. Salomon

## 2018-10-30 ENCOUNTER — RADIANT APPOINTMENT (OUTPATIENT)
Dept: CARDIOLOGY | Facility: CLINIC | Age: 48
End: 2018-10-30
Attending: INTERNAL MEDICINE
Payer: COMMERCIAL

## 2018-10-30 DIAGNOSIS — R06.09 DYSPNEA ON EXERTION: ICD-10-CM

## 2018-10-30 DIAGNOSIS — R00.2 HEART PALPITATIONS: ICD-10-CM

## 2018-10-30 PROCEDURE — 93018 CV STRESS TEST I&R ONLY: CPT | Performed by: INTERNAL MEDICINE

## 2018-10-30 PROCEDURE — 93350 STRESS TTE ONLY: CPT | Mod: TC | Performed by: INTERNAL MEDICINE

## 2018-10-30 PROCEDURE — 40000264 ECHO STRESS WITH OPTISON: Performed by: INTERNAL MEDICINE

## 2018-10-30 PROCEDURE — 93321 DOPPLER ECHO F-UP/LMTD STD: CPT | Mod: 26 | Performed by: INTERNAL MEDICINE

## 2018-10-30 PROCEDURE — 93350 STRESS TTE ONLY: CPT | Mod: 26 | Performed by: INTERNAL MEDICINE

## 2018-10-30 PROCEDURE — 93352 ADMIN ECG CONTRAST AGENT: CPT | Performed by: INTERNAL MEDICINE

## 2018-10-30 PROCEDURE — 93017 CV STRESS TEST TRACING ONLY: CPT | Performed by: INTERNAL MEDICINE

## 2018-10-30 PROCEDURE — 93016 CV STRESS TEST SUPVJ ONLY: CPT | Performed by: INTERNAL MEDICINE

## 2018-10-30 PROCEDURE — 93325 DOPPLER ECHO COLOR FLOW MAPG: CPT | Mod: 26 | Performed by: INTERNAL MEDICINE

## 2018-10-30 PROCEDURE — 93321 DOPPLER ECHO F-UP/LMTD STD: CPT | Mod: TC | Performed by: INTERNAL MEDICINE

## 2018-10-30 PROCEDURE — 93325 DOPPLER ECHO COLOR FLOW MAPG: CPT | Mod: TC | Performed by: INTERNAL MEDICINE

## 2018-10-30 RX ADMIN — Medication 3 ML: at 15:00

## 2018-10-30 NOTE — NURSING NOTE
Bicycle Stress Echocardiogram with Optison performed.  IV started in Left hand.    Optison:   3ml Optison mixed with 6ml Saline - [id number]  Amount used: 3.0ml, 6.0ml wasted.    Supervising provider:  Dr. Lynne

## 2018-11-01 NOTE — PROGRESS NOTES
The stress test is normal.  I feel it is safe for you to exercise.  I would like to see you back towards the end of month to see if we have made progress with the Plate Method of eating.  Consideration for starting an injection for appetite can be discussed more at this appointment.    Let me know if you have any questions.    Dr. Salomon

## 2018-11-15 ENCOUNTER — TELEPHONE (OUTPATIENT)
Dept: FAMILY MEDICINE | Facility: CLINIC | Age: 48
End: 2018-11-15

## 2018-11-15 ENCOUNTER — MYC REFILL (OUTPATIENT)
Dept: FAMILY MEDICINE | Facility: CLINIC | Age: 48
End: 2018-11-15

## 2018-11-15 DIAGNOSIS — I10 ESSENTIAL HYPERTENSION: ICD-10-CM

## 2018-11-15 RX ORDER — HYDROCHLOROTHIAZIDE 25 MG/1
25 TABLET ORAL DAILY
Qty: 30 TABLET | Refills: 0 | Status: SHIPPED | OUTPATIENT
Start: 2018-11-15 | End: 2018-11-19

## 2018-11-15 RX ORDER — LISINOPRIL 10 MG/1
10 TABLET ORAL DAILY
Qty: 30 TABLET | Refills: 0 | Status: SHIPPED | OUTPATIENT
Start: 2018-11-15 | End: 2018-11-19

## 2018-11-15 NOTE — TELEPHONE ENCOUNTER
1 month supply only as patient is overdue for appointment     Patient was instructed at 5/9/18 appointment to follow up in 6 months for BP.    [employer] message sent to patient.   See message for details.    PRESTON Ventura RN

## 2018-11-15 NOTE — TELEPHONE ENCOUNTER
TC, see [employer] refill request from today.    PARIS VenturaN RN

## 2018-11-15 NOTE — TELEPHONE ENCOUNTER
Patient is calling and is unsure why he needs to come in to be seen again.  Please call to advise. Caller informed that calls received after 3pm may not be returned same day.  Thank you

## 2018-11-15 NOTE — TELEPHONE ENCOUNTER
Message from Jeremyt:  Original authorizing provider: José Antonio Malhotra MD    Jovi Kenney would like a refill of the following medications:  hydrochlorothiazide (HYDRODIURIL) 25 MG tablet [José Antonio Malhotra MD]  lisinopril (PRINIVIL/ZESTRIL) 10 MG tablet [José Antonio Malhotra MD]    Preferred pharmacy: [facility] [address] [place] [address] [address]    Comment:

## 2018-11-16 NOTE — TELEPHONE ENCOUNTER
Patient states he missed a call from the clinic. Calling about med refills on his Lisinoipril and Hydrochlorthiazide. Can he get 3 mths of refills to cover until he is due for his physical again? Ok to leave a message.

## 2018-11-16 NOTE — TELEPHONE ENCOUNTER
Left message for pt to call me back [phone]. Pt needs to come in for a BP office visit since over due.  Nancy Jacques TC

## 2018-11-16 NOTE — TELEPHONE ENCOUNTER
Patient is picking up the 30 day prescription but wondering if Dr. José Antonio Malhotra will refill them again x90 days plus 1 additional ; he does see Dr. Salomon for weight management on regular basis (sees her again 11/19) and bp has been good.  Due for his physical again in May.  ?ing if will refill his meds til then.  Please advise next week.  Sharona Reyes RN    BP Readings from Last 3 Encounters:   10/17/18 126/80   05/09/18 130/88   04/03/18 (!) 162/99     No health maintenance due.

## 2018-11-19 ENCOUNTER — OFFICE VISIT (OUTPATIENT)
Dept: INTERNAL MEDICINE | Facility: CLINIC | Age: 48
End: 2018-11-19
Payer: COMMERCIAL

## 2018-11-19 VITALS
BODY MASS INDEX: 52.2 KG/M2 | TEMPERATURE: 96.9 F | HEART RATE: 80 BPM | OXYGEN SATURATION: 97 % | RESPIRATION RATE: 16 BRPM | SYSTOLIC BLOOD PRESSURE: 112 MMHG | DIASTOLIC BLOOD PRESSURE: 70 MMHG | WEIGHT: 315 LBS

## 2018-11-19 DIAGNOSIS — I10 ESSENTIAL HYPERTENSION: ICD-10-CM

## 2018-11-19 DIAGNOSIS — E66.01 MORBID OBESITY WITH BMI OF 50.0-59.9, ADULT (H): Primary | ICD-10-CM

## 2018-11-19 DIAGNOSIS — R79.89 ELEVATED TSH: ICD-10-CM

## 2018-11-19 PROCEDURE — 99213 OFFICE O/P EST LOW 20 MIN: CPT | Performed by: INTERNAL MEDICINE

## 2018-11-19 RX ORDER — LIRAGLUTIDE 6 MG/ML
INJECTION SUBCUTANEOUS
Qty: 3 ML | Refills: 1 | Status: SHIPPED | OUTPATIENT
Start: 2018-11-19 | End: 2018-12-17

## 2018-11-19 RX ORDER — HYDROCHLOROTHIAZIDE 25 MG/1
25 TABLET ORAL DAILY
Qty: 90 TABLET | Refills: 1 | Status: SHIPPED | OUTPATIENT
Start: 2018-11-19 | End: 2019-07-22

## 2018-11-19 RX ORDER — LISINOPRIL 10 MG/1
10 TABLET ORAL DAILY
Qty: 90 TABLET | Refills: 1 | Status: SHIPPED | OUTPATIENT
Start: 2018-11-19 | End: 2019-07-22

## 2018-11-19 ASSESSMENT — PAIN SCALES - GENERAL: PAINLEVEL: NO PAIN (0)

## 2018-11-19 NOTE — NURSING NOTE
Victoza self injection teaching completed.  Patient was able to return demonstration on injection practice kit without any issues  Written instructions given  Patient was advised to call if he has any questions  Patient verbalized understanding.    Gilmar Ta RN

## 2018-11-19 NOTE — MR AVS SNAPSHOT
After Visit Summary   11/19/2018    Jovi Kenney    MRN: [id number]           Patient Information     Date Of Birth          1970        Visit Information        Provider Department      11/19/2018 5:00 PM Yue Salomon MD [facility]        Today's Diagnoses     BMI 50.0-59.9, adult (H)    -  1    Elevated TSH        Essential hypertension          Care Instructions    Liraglutide per package directions.  Follow up in 1 month.  Lab work a day or two before the appointment.         [facility]    If you have any questions regarding to your visit please contact your care team:     Team Pink:   Clinic Hours Telephone Number   Internal Medicine:  Dr. Yue Heaton, NP 7am-7pm  Monday - Thursday   7am-5pm  Fridays  [phone]  (Appointment scheduling available 24/7)   Urgent Care - [place] - 11am-9pm Monday-Friday Saturday-Sunday- 9am-5pm   [place] - 5pm-9pm Monday-Friday Saturday-Sunday- 9am-5pm  [phone] - [place]  [phone] - [place]       What options do I have for a visit other than an office visit? We offer electronic visits (e-visits) and telephone visits, when medically appropriate.  Please check with your medical insurance to see if these types of visits are covered, as you will be responsible for any charges that are not paid by your insurance.      You can use [facility] (secure electronic communication) to access to your chart, send your primary care provider a message, or make an appointment. Ask a team member how to get started.     For a price quote for your services, please call our Consumer Price Line at [phone] or our Imaging Cost estimation line at [phone] (for imaging tests).  Stella GONSALES CMA ([facility])            Follow-ups after your visit        Follow-up notes from your care team     Return in about 4 weeks (around 12/17/2018) for weight management- lab a couple  days before.      Future tests that were ordered for you today     Open Future Orders        Priority Expected Expires Ordered    **Basic metabolic panel FUTURE anytime Routine 11/19/2018 11/19/2019 11/19/2018            Who to contact     If you have questions or need follow up information about today's clinic visit or your schedule please contact [facility] directly at [phone].  Normal or non-critical lab and imaging results will be communicated to you by [employer], letter or phone within 4 business days after the clinic has received the results. If you do not hear from us within 7 days, please contact the clinic through [employer] or phone. If you have a critical or abnormal lab result, we will notify you by phone as soon as possible.  Submit refill requests through [employer] or call your pharmacy and they will forward the refill request to us. Please allow 3 business days for your refill to be completed.          Additional Information About Your Visit        [facility] Information     [employer] gives you secure access to your electronic health record. If you see a primary care provider, you can also send messages to your care team and make appointments. If you have questions, please call your primary care clinic.  If you do not have a primary care provider, please call [phone] and they will assist you.        Care EveryWhere ID     This is your Care EveryWhere ID. This could be used by other organizations to access your [place] medical records  [id number]        Your Vitals Were     Pulse Temperature Respirations Pulse Oximetry BMI (Body Mass Index)       80 96.9  F (36.1  C) (Oral) 16 97% 52.2 kg/m2        Blood Pressure from Last 3 Encounters:   11/19/18 112/70   10/17/18 126/80   05/09/18 130/88    Weight from Last 3 Encounters:   11/19/18 (!) 353 lb 8 oz (160.3 kg)   10/17/18 (!) 358 lb (162.4 kg)   05/09/18 (!) 361 lb (163.7 kg)                 Today's Medication Changes          These  changes are accurate as of 11/19/18  5:11 PM.  If you have any questions, ask your nurse or doctor.               Start taking these medicines.        Dose/Directions    insulin pen needle 31G X 5 MM miscellaneous   Commonly known as:  B-D U/F   Used for:  BMI 50.0-59.9, adult (H)   Started by:  Yue Salomon MD        Use once daily or as directed.   Quantity:  100 each   Refills:  1       liraglutide 18 MG/3ML soln   Commonly known as:  VICTOZA   Used for:  BMI 50.0-59.9, adult (H)   Started by:  Yue Salomon MD        Take 0.6 mg daily for 1 week, then 1.2 mg daily for 1 week, then 1.8 mg daily.   Quantity:  3 mL   Refills:  1            Where to get your medicines      These medications were sent to [facility] [address] [place] [address] [id number]  [address], [place] [address]     Phone:  [phone]     hydrochlorothiazide 25 MG tablet    insulin pen needle 31G X 5 MM miscellaneous    liraglutide 18 MG/3ML soln    lisinopril 10 MG tablet                Primary Care Provider Office Phone # Fax #    José Antonio Malhotra [phone] [address] [facility] [address]        Equal Access to Services     [facility] AH: Hadii aad ku hadasho Soomaali, waaxda luqadaha, qaybta kaalmada adeegyada, ernesto hunter hayjanice ball . So [facility] [phone].    ATENCIÓN: Si habla español, tiene a bush disposición servicios gratuitos de asistencia lingüística. Llame al [phone].    We comply with applicable federal civil rights laws and [place] laws. We do not discriminate on the basis of race, color, national origin, age, disability, sex, sexual orientation, or gender identity.            Thank you!     Thank you for choosing [facility] [place] [facility]\""  for your care. Our goal is always to provide you with excellent care. Hearing back from our patients is one way we can continue to improve our services. Please take a few minutes to complete the written survey  that you may receive in the mail after your visit with us. Thank you!             Your Updated Medication List - Protect others around you: Learn how to safely use, store and throw away your medicines at www.disposemymeds.org.          This list is accurate as of 11/19/18  5:11 PM.  Always use your most recent med list.                   Brand Name Dispense Instructions for use Diagnosis    ALLEGRA PO           hydrochlorothiazide 25 MG tablet    HYDRODIURIL    90 tablet    Take 1 tablet (25 mg) by mouth daily    Essential hypertension       insulin pen needle 31G X 5 MM miscellaneous    B-D U/F    100 each    Use once daily or as directed.    BMI 50.0-59.9, adult (H)       liraglutide 18 MG/3ML soln    VICTOZA    3 mL    Take 0.6 mg daily for 1 week, then 1.2 mg daily for 1 week, then 1.8 mg daily.    BMI 50.0-59.9, adult (H)       lisinopril 10 MG tablet    PRINIVIL/ZESTRIL    90 tablet    Take 1 tablet (10 mg) by mouth daily    Essential hypertension

## 2018-11-19 NOTE — PATIENT INSTRUCTIONS
Liraglutide per package directions.  Follow up in 1 month.  Lab work a day or two before the appointment.         [facility]    If you have any questions regarding to your visit please contact your care team:     Team Pink:   Clinic Hours Telephone Number   Internal Medicine:  Dr. Yue Heaton NP 7am-7pm  Monday - Thursday   7am-5pm  Fridays  [phone]  (Appointment scheduling available 24/7)   Urgent Care - [place] and [facility] - 11am-9pm Monday-Friday Saturday-Sunday- 9am-5pm   [place] - 5pm-9pm Monday-Friday Saturday-Sunday- 9am-5pm  [phone] - [place]  [phone] - [place]       What options do I have for a visit other than an office visit? We offer electronic visits (e-visits) and telephone visits, when medically appropriate.  Please check with your medical insurance to see if these types of visits are covered, as you will be responsible for any charges that are not paid by your insurance.      You can use [employer] (secure electronic communication) to access to your chart, send your primary care provider a message, or make an appointment. Ask a team member how to get started.     For a price quote for your services, please call our Consumer Price Line at [phone] or our Imaging Cost estimation line at [phone] (for imaging tests).  Stella GONSALES CMA ([facility])

## 2018-11-19 NOTE — PROGRESS NOTES
INTERNAL MEDICINE  Medical Weight Loss - Return Visit      CHIEF COMPLAINT:  Recheck weight      Wt Readings from Last 5 Encounters:   11/19/18 (!) 353 lb 8 oz (160.3 kg)   10/17/18 (!) 358 lb (162.4 kg)   05/09/18 (!) 361 lb (163.7 kg)   04/03/18 (!) 367 lb (166.5 kg)   04/05/17 (!) 350 lb (158.8 kg)         HISTORY OF PRESENT ILLNESS (HPI):    Patient returns today for medical weight loss follow-up visit. Patient was last seen by me on 10/17/2018 and has lost 5 pounds and 1.5 % body fat.      Is doing the Plate Method.  Has problems finding enough non starchy veggies.  Has drastically cut down on soda since his last visit.  He will take it if it comes with a meal but has cut back on refills.    Hasn't done [facility] Well[facility] but is considering this.    Stress test was normal. Is now looking at getting time in the gym.    No family history of pancreatic or MTC or pancreatitis.    MEDICATIONS:   Current Outpatient Prescriptions   Medication Sig Dispense Refill     hydrochlorothiazide (HYDRODIURIL) 25 MG tablet Take 1 tablet (25 mg) by mouth daily 30 tablet 0     lisinopril (PRINIVIL/ZESTRIL) 10 MG tablet Take 1 tablet (10 mg) by mouth daily 30 tablet 0     Fexofenadine HCl (ALLEGRA PO)          ALLERGIES:   No Known Allergies    PHYSICAL EXAMINATION:    VITALS: /70 (BP Location: Right arm, Cuff Size: Adult Large)  Pulse 80  Temp 96.9  F (36.1  C) (Oral)  Resp 16  Wt (!) 353 lb 8 oz (160.3 kg)  SpO2 97%  BMI 52.2 kg/m2  GENERAL: Patient is a 48 year old year old male  in no acute distress.  Patient is alert and orientated x 4, pleasant and cooperative with exam.       PSYCH: mentation appears normal, affect normal and bright.    LAB RESULTS:   Last Comprehensive Metabolic Panel:  Sodium   Date Value Ref Range Status   06/08/2018 138 133 - 144 mmol/L Final     Potassium   Date Value Ref Range Status   06/08/2018 3.7 3.4 - 5.3 mmol/L Final     Chloride   Date Value Ref Range Status   06/08/2018 104 94 - 109  mmol/L Final     Carbon Dioxide   Date Value Ref Range Status   06/08/2018 26 20 - 32 mmol/L Final     Anion Gap   Date Value Ref Range Status   06/08/2018 8 3 - 14 mmol/L Final     Glucose   Date Value Ref Range Status   06/08/2018 97 70 - 99 mg/dL Final     Comment:     Fasting specimen     Urea Nitrogen   Date Value Ref Range Status   06/08/2018 16 7 - 30 mg/dL Final     Creatinine   Date Value Ref Range Status   06/08/2018 1.09 0.66 - 1.25 mg/dL Final     GFR Estimate   Date Value Ref Range Status   06/08/2018 72 >60 mL/min/1.7m2 Final     Comment:     Non  GFR Calc     Calcium   Date Value Ref Range Status   06/08/2018 8.9 8.5 - 10.1 mg/dL Final       ASSESSMENT/PLAN:    1. BMI 50.0-59.9, adult (H)  Per patient instructions. Doing well.  Add on liraglutide now that he has diet and exercise in place  .  No one called him for his nutrition appointment so will have someone reach out to him.  - liraglutide (VICTOZA) 18 MG/3ML soln; Take 0.6 mg daily for 1 week, then 1.2 mg daily for 1 week, then 1.8 mg daily.  Dispense: 3 mL; Refill: 1  - insulin pen needle (B-D U/F) 31G X 5 MM miscellaneous; Use once daily or as directed.  Dispense: 100 each; Refill: 1    2. Elevated TSH  Likely due to obesity.      3. Essential hypertension    - hydrochlorothiazide (HYDRODIURIL) 25 MG tablet; Take 1 tablet (25 mg) by mouth daily  Dispense: 90 tablet; Refill: 1  - lisinopril (PRINIVIL/ZESTRIL) 10 MG tablet; Take 1 tablet (10 mg) by mouth daily  Dispense: 90 tablet; Refill: 1  - **Basic metabolic panel FUTURE anytime; Future    4. Morbid obesity with BMI of 50.0-59.9, adult (H)          Patient is to call the clinic if he experiences any adverse reactions.     Patient Instructions     Liraglutide per package directions.  Follow up in 1 month.  Lab work a day or two before the appointment.         [facility]    If you have any questions regarding to your visit please contact your care team:     Team  Pink:   Clinic Hours Telephone Number   Internal Medicine:  Dr. Yue Heaton, NP 7am-7pm  Monday - Thursday   7am-5pm  Fridays  [phone]  (Appointment scheduling available 24/7)   Urgent Care - [place] and [facility] - 11am-9pm Monday-Friday Saturday-Sunday- 9am-5pm   [place] - 5pm-9pm Monday-Friday Saturday-Sunday- 9am-5pm  [phone] - [place]  [phone] - [place]       What options do I have for a visit other than an office visit? We offer electronic visits (e-visits) and telephone visits, when medically appropriate.  Please check with your medical insurance to see if these types of visits are covered, as you will be responsible for any charges that are not paid by your insurance.      You can use [employer] (secure electronic communication) to access to your chart, send your primary care provider a message, or make an appointment. Ask a team member how to get started.     For a price quote for your services, please call our Consumer Price Line at [phone] or our Imaging Cost estimation line at [phone] (for imaging tests).  Stella GONSALES CMA (AAMA)         Yue Salomon MD  Internal Medicine    American Board of Obesity Medicine Diplomate

## 2018-11-20 PROBLEM — R79.89 ELEVATED TSH: Status: ACTIVE | Noted: 2018-11-20

## 2018-11-21 NOTE — PROGRESS NOTES
Left non specific voice message for patient to call [phone] to schedule appointment for referral recommended by Dr Salomon at last appointment.    Randi Miller RD LD CDE

## 2018-12-11 ENCOUNTER — OFFICE VISIT (OUTPATIENT)
Dept: FAMILY MEDICINE | Facility: CLINIC | Age: 48
End: 2018-12-11
Payer: COMMERCIAL

## 2018-12-11 VITALS
RESPIRATION RATE: 20 BRPM | SYSTOLIC BLOOD PRESSURE: 110 MMHG | WEIGHT: 315 LBS | DIASTOLIC BLOOD PRESSURE: 66 MMHG | HEIGHT: 69 IN | BODY MASS INDEX: 46.65 KG/M2 | TEMPERATURE: 98.9 F | HEART RATE: 109 BPM | OXYGEN SATURATION: 96 %

## 2018-12-11 DIAGNOSIS — I10 ESSENTIAL HYPERTENSION: ICD-10-CM

## 2018-12-11 DIAGNOSIS — R19.7 DIARRHEA, UNSPECIFIED TYPE: Primary | ICD-10-CM

## 2018-12-11 LAB
ANION GAP SERPL CALCULATED.3IONS-SCNC: 7 MMOL/L (ref 3–14)
BUN SERPL-MCNC: 11 MG/DL (ref 7–30)
CALCIUM SERPL-MCNC: 9.6 MG/DL (ref 8.5–10.1)
CHLORIDE SERPL-SCNC: 107 MMOL/L (ref 94–109)
CO2 SERPL-SCNC: 28 MMOL/L (ref 20–32)
CREAT SERPL-MCNC: 1.3 MG/DL (ref 0.66–1.25)
GFR SERPL CREATININE-BSD FRML MDRD: 59 ML/MIN/1.7M2
GLUCOSE SERPL-MCNC: 80 MG/DL (ref 70–99)
POTASSIUM SERPL-SCNC: 3.9 MMOL/L (ref 3.4–5.3)
SODIUM SERPL-SCNC: 142 MMOL/L (ref 133–144)

## 2018-12-11 PROCEDURE — 99213 OFFICE O/P EST LOW 20 MIN: CPT | Performed by: INTERNAL MEDICINE

## 2018-12-11 PROCEDURE — 80048 BASIC METABOLIC PNL TOTAL CA: CPT | Performed by: INTERNAL MEDICINE

## 2018-12-11 PROCEDURE — 36415 COLL VENOUS BLD VENIPUNCTURE: CPT | Performed by: INTERNAL MEDICINE

## 2018-12-11 ASSESSMENT — PAIN SCALES - GENERAL: PAINLEVEL: NO PAIN (0)

## 2018-12-11 ASSESSMENT — MIFFLIN-ST. JEOR: SCORE: 2397.61

## 2018-12-11 NOTE — LETTER
[facility]  [address] [place] [address]  [place] [address]  [phone]          December 11, 2018    Jovi Kenney                                                                                                                     [id number] [facility]  [place] [address]            Dear Jovi, and To Whom it May Concern,     This patient has been suffering with a medical condition requiring missing some work. We expect a full recovery by one week from today . Please let me know if you have any questions for me further .    Sincerely,         Adrián Randall MD

## 2018-12-11 NOTE — PROGRESS NOTES
SUBJECTIVE:   Jovi Kenney is a 48 year old male who presents to clinic today for the following health issues:       Diarrhea, unspecified type  Essential hypertension     Mr. Jovi Kenney is a very pleasant gentleman with morbid obesity and was prescribed Liraglutide [ Victoza ] by one of my partners , Dr Yue Salomon. This was for the primary diagnosis of weight loss and not due to underlying diabetes mellitus. He's developed a persistent and somewhat explosive diarrhea associated with rumbling borborygmi and some abdominal discomfort. He's had brief bouts with diarrhea such as viral gastroenteritis or food poisoning a few times in his life but he recognizes this current problem is a tad different as it has absolutely settled down into a chronic pattern and it's roughly 10 days now and to the point that he recognizes a problem . Detailed history is negative for fever or chills or coughing, no recent international travel or use of antibiotics, no exposures to anyone else with this condition . Makes no different what he eats.    Diarrhea     Duration: 8 days     Description:  Diarrhea     Intensity:  moderate    Accompanying signs and symptoms (fever/discharge/nausea/vomiting/back or abdominal pain):  Liquid, nausea, fatigue    History (frequent UTI's/kidney stones/prostate problems): None    Precipitating or alleviating factors: None    Therapies tried and outcome: none   Outcome: none    Problem list and histories reviewed & adjusted, as indicated.  Additional history: as documented    Patient Active Problem List   Diagnosis     Seizures (H)     Essential hypertension     Sleep apnea, unspecified type     Hyperlipidemia, unspecified hyperlipidemia type     BMI 50.0-59.9, adult (H)     Skin tag     Elevated TSH     Past Surgical History:   Procedure Laterality Date     tonsils and adnoids         Social History     Tobacco Use     Smoking status: Never Smoker     Smokeless tobacco: Never Used   Substance Use Topics  "    Alcohol use: Yes     Comment: less than once a month      Family History   Problem Relation Age of Onset     Bronchitis Mother      Hypertension Mother          Current Outpatient Medications   Medication Sig Dispense Refill     Fexofenadine HCl (ALLEGRA PO)        hydrochlorothiazide (HYDRODIURIL) 25 MG tablet Take 1 tablet (25 mg) by mouth daily 90 tablet 1     insulin pen needle (B-D U/F) 31G X 5 MM miscellaneous Use once daily or as directed. 100 each 1     liraglutide (VICTOZA) 18 MG/3ML soln Take 0.6 mg daily for 1 week, then 1.2 mg daily for 1 week, then 1.8 mg daily. 3 mL 1     lisinopril (PRINIVIL/ZESTRIL) 10 MG tablet Take 1 tablet (10 mg) by mouth daily 90 tablet 1     No Known Allergies  Recent Labs   Lab Test 12/11/18  1449 10/17/18  1246 06/08/18  0911 04/03/18  1732 04/01/17  1102   A1C  --  5.0  --   --   --    LDL  --   --  95  --  131*   HDL  --   --  29*  --  32*   TRIG  --   --  328*  --  234*   ALT  --   --  63 60 67   CR 1.30*  --  1.09  --  1.04   GFRESTIMATED 59*  --  72  --  76   GFRESTBLACK 71  --  87  --  >90   GFR Calc     POTASSIUM 3.9  --  3.7  --  3.7   TSH  --  4.06*  --   --   --       BP Readings from Last 3 Encounters:   12/11/18 110/66   11/19/18 112/70   10/17/18 126/80    Wt Readings from Last 3 Encounters:   12/11/18 (!) 154.2 kg (340 lb)   11/19/18 (!) 160.3 kg (353 lb 8 oz)   10/17/18 (!) 162.4 kg (358 lb)                  Labs reviewed in EPIC    Reviewed and updated as needed this visit by clinical staff  Tobacco  Allergies  Meds  Med Hx  Surg Hx  Fam Hx  Soc Hx      Reviewed and updated as needed this visit by Provider         ROS:  Constitutional, HEENT, cardiovascular, pulmonary, gi and gu systems are negative, except as otherwise noted.    OBJECTIVE:                                                    /66   Pulse 109   Temp 98.9  F (37.2  C) (Oral)   Resp 20   Ht 1.753 m (5' 9\")   Wt (!) 154.2 kg (340 lb)   SpO2 96%   BMI 50.21 " kg/m    Body mass index is 50.21 kg/m .  GENERAL APPEARANCE: healthy, alert and no distress  RESP: lungs clear to auscultation - no rales, rhonchi or wheezes  CV: regular rates and rhythm, normal S1 S2, no S3 or S4 and no murmur, click or rub  ABDOMEN: soft, nontender, without hepatosplenomegaly or masses and bowel sounds normal  No involuntary guarding or rebound tenderness , no peritoneal signs . Slight nonspecific tenderness to palpation     Diagnostic test results:  Diagnostic Test Results:  none      ASSESSMENT/PLAN:                                                    1. Diarrhea, unspecified type  This patient has what I think is gastrointestinal symptoms due to the Liraglutide [ Victoza ] as going through the history there's no other obvious explanations. We could certainly test further with stool studies to include ova and parasites, cdiff etc. Or even a colonoscopy but this seems a second step, the first step is to hold Liraglutide [ Victoza ] and patient also has an upcoming follow up with Dr. Salomon this coming Friday . Typically the diarrhea will resolve off the medication within 1-2 weeks. His last Liraglutide [ Victoza ] dose was last Saturday , 3 days ago    2. Essential hypertension    - **Basic metabolic panel FUTURE anytime      Follow up with Provider - as detailed above      Adrián Randall MD  [facility]

## 2018-12-12 NOTE — RESULT ENCOUNTER NOTE
We will discuss this further in the office but your kidney function is worse.  Please increase your intake of water and avoid all NSAIDS ( over the counter advil, ibuprofen, alleve, naproxen).  Dr. Salomon

## 2018-12-17 ENCOUNTER — OFFICE VISIT (OUTPATIENT)
Dept: INTERNAL MEDICINE | Facility: CLINIC | Age: 48
End: 2018-12-17
Payer: COMMERCIAL

## 2018-12-17 VITALS
DIASTOLIC BLOOD PRESSURE: 66 MMHG | HEART RATE: 90 BPM | WEIGHT: 315 LBS | RESPIRATION RATE: 20 BRPM | OXYGEN SATURATION: 99 % | SYSTOLIC BLOOD PRESSURE: 120 MMHG | BODY MASS INDEX: 46.65 KG/M2 | TEMPERATURE: 97.3 F | HEIGHT: 69 IN

## 2018-12-17 DIAGNOSIS — N28.9 RENAL INSUFFICIENCY: Primary | ICD-10-CM

## 2018-12-17 LAB
ANION GAP SERPL CALCULATED.3IONS-SCNC: 11 MMOL/L (ref 3–14)
BUN SERPL-MCNC: 13 MG/DL (ref 7–30)
CALCIUM SERPL-MCNC: 9.7 MG/DL (ref 8.5–10.1)
CHLORIDE SERPL-SCNC: 100 MMOL/L (ref 94–109)
CO2 SERPL-SCNC: 25 MMOL/L (ref 20–32)
CREAT SERPL-MCNC: 1.3 MG/DL (ref 0.66–1.25)
GFR SERPL CREATININE-BSD FRML MDRD: 59 ML/MIN/1.7M2
GLUCOSE SERPL-MCNC: 92 MG/DL (ref 70–99)
POTASSIUM SERPL-SCNC: 3.7 MMOL/L (ref 3.4–5.3)
SODIUM SERPL-SCNC: 136 MMOL/L (ref 133–144)

## 2018-12-17 PROCEDURE — 80048 BASIC METABOLIC PNL TOTAL CA: CPT | Performed by: INTERNAL MEDICINE

## 2018-12-17 PROCEDURE — 36415 COLL VENOUS BLD VENIPUNCTURE: CPT | Performed by: INTERNAL MEDICINE

## 2018-12-17 PROCEDURE — 99213 OFFICE O/P EST LOW 20 MIN: CPT | Performed by: INTERNAL MEDICINE

## 2018-12-17 ASSESSMENT — MIFFLIN-ST. JEOR: SCORE: 2404.46

## 2018-12-17 ASSESSMENT — PAIN SCALES - GENERAL: PAINLEVEL: NO PAIN (0)

## 2018-12-17 NOTE — PROGRESS NOTES
"Medical Weight Loss - Return Visit    CHIEF COMPLAINT:     Chief Complaint   Patient presents with     Weight Check     4 week weight follow-up       HISTORY OF PRESENT ILLNESS (HPI):    Patient  returns today for medical weight loss follow-up visit. Patient was last seen by me on 11/19/2018 and has lost 12 pounds.  He has started to do the 7 minute work out, but hasn't had a lot of energy the past few weeks so he has fallen off of that. He has not yet been called by the nutritionist. He is trying to work on his behavioral changes.   He had a reaction to the higher dose of the liraglutide. He did not feel any benefit from the lower dose injection. He had diarrhea that lasted for 10 days.     Wt Readings from Last 4 Encounters:   12/17/18 (!) 154.9 kg (341 lb 8.2 oz)   12/11/18 (!) 154.2 kg (340 lb)   11/19/18 (!) 160.3 kg (353 lb 8 oz)   10/17/18 (!) 162.4 kg (358 lb)      Patient is not seeing dietitian   Patient is not seeing PT   Patient is not seeing a behavioralist    MEDICATIONS:   Current Outpatient Medications   Medication Sig Dispense Refill     hydrochlorothiazide (HYDRODIURIL) 25 MG tablet Take 1 tablet (25 mg) by mouth daily 90 tablet 1     lisinopril (PRINIVIL/ZESTRIL) 10 MG tablet Take 1 tablet (10 mg) by mouth daily 90 tablet 1     Fexofenadine HCl (ALLEGRA PO)          ALLERGIES:   Allergies   Allergen Reactions     Liraglutide          REVIEW OF SYSTEMS:   ROS: 10 point ROS neg other than the symptoms noted above in the HPI.     This document serves as a record of the services and decisions personally performed and made by Yue Salomon MD. It was created on her behalf by Rama Oliver, a trained medical scribe. The creation of this document is based the provider's statements to the medical scribe.  Rama Oliver, December 17, 2018 3:06 PM     PHYSICAL EXAMINATION:    VITALS: /66   Pulse 90   Temp 97.3  F (36.3  C) (Oral)   Resp 20   Ht 1.753 m (5' 9\")   Wt (!) 154.9 kg (341 lb 8.2 oz)   SpO2 " 99%   BMI 50.43 kg/m    GENERAL: Patient is a 48 year old year old male  in no acute distress.  Patient is alert and orientated x 4, pleasant and cooperative with exam.         LAB RESULTS:   Office Visit on 12/11/2018   Component Date Value Ref Range Status     Sodium 12/11/2018 142  133 - 144 mmol/L Final     Potassium 12/11/2018 3.9  3.4 - 5.3 mmol/L Final     Chloride 12/11/2018 107  94 - 109 mmol/L Final     Carbon Dioxide 12/11/2018 28  20 - 32 mmol/L Final     Anion Gap 12/11/2018 7  3 - 14 mmol/L Final     Glucose 12/11/2018 80  70 - 99 mg/dL Final     Urea Nitrogen 12/11/2018 11  7 - 30 mg/dL Final     Creatinine 12/11/2018 1.30* 0.66 - 1.25 mg/dL Final     GFR Estimate 12/11/2018 59* >60 mL/min/1.7m2 Final    Non  GFR Calc     GFR Estimate If Black 12/11/2018 71  >60 mL/min/1.7m2 Final    African American GFR Calc     Calcium 12/11/2018 9.6  8.5 - 10.1 mg/dL Final       ASSESSMENT/PLAN:    1. Renal insufficiency  Unclear etiology.  Patient feels it was related to diarrhea and dehydration.  - Basic metabolic panel    2. BMI 50.0-59.9, adult (H)  Discussed next options for medications since he had a reaction to the liraglutide, including topamax and naltrexone. Ultimately I feel his best next step is surgery but patient doesn't want to explore this.       Patient is to call the clinic if he experiences any adverse reactions.     Patient Instructions   Start food logging. You can download an jermaine to help you with this, such as UltiZen, SP3H, or Lose It. After 1 week of logging, I would like to see your weekly average for protein, fats, carbs and calories.  You can send this to me on [employer].     Work more on the exercise. Set aside some time to yourself every day to focus on your own health.     Also consider getting involved in a gym class, or with a group like [employer]. You can look into getting an individual  as well.       The information in this document,  created by the medical scribe, Rama Oliver, for me, accurately reflects the services I personally performed and the decisions made by me. I have reviewed and approved this document for accuracy.     Yue Salomon MD  Internal Medicine    American Board of Obesity Medicine Diplomate

## 2018-12-17 NOTE — PATIENT INSTRUCTIONS
Start food logging. You can download an jermaine to help you with this, such as [employer], [employer], or Lose It. After 1 week of logging, I would like to see your weekly average for protein, fats, carbs and calories.  You can send this to me on [facility].     Work more on the exercise. Set aside some time to yourself every day to focus on your own health.     Also consider getting involved in a gym class, or with a group like [employer]. You can look into getting an individual  as well.     [facility]    If you have any questions regarding to your visit please contact your care team:     Team Pink:   Clinic Hours Telephone Number   Internal Medicine:  Dr. Yue Heaton NP 7am-7pm  Monday - Thursday   7am-5pm  Fridays  [phone]  (Appointment scheduling available 24/7)   [place]gent [facility] - [place] - 11am-9pm Monday-Friday Saturday-Sunday- 9am-5pm   [place] - 5pm-9pm Monday-Friday Saturday-Sunday- 9am-5pm  [phone] - [place]  [phone] - [place]       What options do I have for a visit other than an office visit? We offer electronic visits (e-visits) and telephone visits, when medically appropriate.  Please check with your medical insurance to see if these types of visits are covered, as you will be responsible for any charges that are not paid by your insurance.      You can use [facility] (secure electronic communication) to access to your chart, send your primary care provider a message, or make an appointment. Ask a team member how to get started.     For a price quote for your services, please call our Consumer Price Line at [phone] or our Imaging Cost estimation line at [phone] (for imaging tests).    Nicole Nava, [facility]

## 2018-12-18 DIAGNOSIS — N28.9 RENAL INSUFFICIENCY: Primary | ICD-10-CM

## 2018-12-18 NOTE — RESULT ENCOUNTER NOTE
Jovi Granados.  Kidney function remains mildly impaired.  I would like a renal ultrasound for further evaluation.    Please call the [facility] at [phone] to schedule.    Dr. Salomon    Indication renal insufficiency

## 2019-04-29 ENCOUNTER — TELEPHONE (OUTPATIENT)
Dept: INTERNAL MEDICINE | Facility: CLINIC | Age: 49
End: 2019-04-29

## 2019-04-29 DIAGNOSIS — Z12.11 SPECIAL SCREENING FOR MALIGNANT NEOPLASMS, COLON: Primary | ICD-10-CM

## 2019-04-29 NOTE — TELEPHONE ENCOUNTER
Panel Management Review      Patient has the following on his problem list:     Hypertension   Last three blood pressure readings:  BP Readings from Last 3 Encounters:   12/17/18 120/66   12/11/18 110/66   11/19/18 112/70     Blood pressure: Passed    HTN Guidelines:  Less than 140/90      Composite cancer screening  Chart review shows that this patient is due/due soon for the following Colonoscopy  Summary:    Patient is due/failing the following:   COLONOSCOPY    Action needed:   Routed to provider for review.    Type of outreach:    None, routed to provider for review.    Questions for provider review:    Please put in order or referral for Colon Cancer Screening                                    LS     Chart routed to Provider .

## 2019-07-22 ENCOUNTER — OFFICE VISIT (OUTPATIENT)
Dept: FAMILY MEDICINE | Facility: CLINIC | Age: 49
End: 2019-07-22
Payer: COMMERCIAL

## 2019-07-22 VITALS
DIASTOLIC BLOOD PRESSURE: 76 MMHG | HEART RATE: 99 BPM | BODY MASS INDEX: 45.1 KG/M2 | TEMPERATURE: 97.1 F | HEIGHT: 70 IN | RESPIRATION RATE: 20 BRPM | SYSTOLIC BLOOD PRESSURE: 106 MMHG | WEIGHT: 315 LBS | OXYGEN SATURATION: 98 %

## 2019-07-22 DIAGNOSIS — Z00.00 ROUTINE HISTORY AND PHYSICAL EXAMINATION OF ADULT: Primary | ICD-10-CM

## 2019-07-22 DIAGNOSIS — L91.8 SKIN TAG: ICD-10-CM

## 2019-07-22 DIAGNOSIS — G47.30 SLEEP APNEA, UNSPECIFIED TYPE: ICD-10-CM

## 2019-07-22 DIAGNOSIS — N52.9 ERECTILE DYSFUNCTION, UNSPECIFIED ERECTILE DYSFUNCTION TYPE: ICD-10-CM

## 2019-07-22 DIAGNOSIS — Z12.11 SPECIAL SCREENING FOR MALIGNANT NEOPLASMS, COLON: ICD-10-CM

## 2019-07-22 DIAGNOSIS — M72.2 PLANTAR FASCIITIS: ICD-10-CM

## 2019-07-22 DIAGNOSIS — E78.5 HYPERLIPIDEMIA, UNSPECIFIED HYPERLIPIDEMIA TYPE: ICD-10-CM

## 2019-07-22 DIAGNOSIS — I10 ESSENTIAL HYPERTENSION: ICD-10-CM

## 2019-07-22 DIAGNOSIS — R79.89 ELEVATED TSH: ICD-10-CM

## 2019-07-22 LAB — HGB BLD-MCNC: 16.1 G/DL (ref 13.3–17.7)

## 2019-07-22 PROCEDURE — 99213 OFFICE O/P EST LOW 20 MIN: CPT | Mod: 25 | Performed by: INTERNAL MEDICINE

## 2019-07-22 PROCEDURE — 85018 HEMOGLOBIN: CPT | Performed by: INTERNAL MEDICINE

## 2019-07-22 PROCEDURE — 80048 BASIC METABOLIC PNL TOTAL CA: CPT | Performed by: INTERNAL MEDICINE

## 2019-07-22 PROCEDURE — 80061 LIPID PANEL: CPT | Performed by: INTERNAL MEDICINE

## 2019-07-22 PROCEDURE — 84443 ASSAY THYROID STIM HORMONE: CPT | Performed by: INTERNAL MEDICINE

## 2019-07-22 PROCEDURE — 99396 PREV VISIT EST AGE 40-64: CPT | Mod: 25 | Performed by: INTERNAL MEDICINE

## 2019-07-22 PROCEDURE — 11200 RMVL SKIN TAGS UP TO&INC 15: CPT | Performed by: INTERNAL MEDICINE

## 2019-07-22 PROCEDURE — 36415 COLL VENOUS BLD VENIPUNCTURE: CPT | Performed by: INTERNAL MEDICINE

## 2019-07-22 RX ORDER — HYDROCHLOROTHIAZIDE 25 MG/1
25 TABLET ORAL DAILY
Qty: 90 TABLET | Refills: 1 | Status: SHIPPED | OUTPATIENT
Start: 2019-07-22 | End: 2020-02-12

## 2019-07-22 RX ORDER — SILDENAFIL CITRATE 20 MG/1
20 TABLET ORAL DAILY PRN
Qty: 30 TABLET | Refills: 3 | Status: SHIPPED | OUTPATIENT
Start: 2019-07-22 | End: 2019-10-02

## 2019-07-22 RX ORDER — LISINOPRIL 10 MG/1
10 TABLET ORAL DAILY
Qty: 90 TABLET | Refills: 1 | Status: SHIPPED | OUTPATIENT
Start: 2019-07-22 | End: 2020-02-12

## 2019-07-22 ASSESSMENT — ENCOUNTER SYMPTOMS
ABDOMINAL PAIN: 0
NAUSEA: 0
DYSURIA: 0
MYALGIAS: 1
PALPITATIONS: 0
HEADACHES: 1
ARTHRALGIAS: 1
SORE THROAT: 0
WEAKNESS: 0
COUGH: 0
EYE PAIN: 0
CONSTIPATION: 0
PARESTHESIAS: 0
HEARTBURN: 0
JOINT SWELLING: 0
FREQUENCY: 0
DIZZINESS: 1
HEMATURIA: 0
NERVOUS/ANXIOUS: 1
CHILLS: 0
DIARRHEA: 0
SHORTNESS OF BREATH: 1
FEVER: 0
HEMATOCHEZIA: 0

## 2019-07-22 ASSESSMENT — MIFFLIN-ST. JEOR: SCORE: 2475.38

## 2019-07-22 NOTE — PROGRESS NOTES
SUBJECTIVE:   CC: Jovi Kenney is an 49 year old male who presents for preventative health visit.        Essential hypertension  Erectile dysfunction, unspecified erectile dysfunction type  Skin tag  Routine history and physical examination of adult  Special screening for malignant neoplasms, colon  Plantar fasciitis  Elevated TSH  Hyperlipidemia, unspecified hyperlipidemia type  Sleep apnea, unspecified type  BMI 50.0-59.9, adult (H)     New patient to the Internal Medicine department . Here for complete physical exam . Primary care patient with Dr. José Antonio Malhotra at [facility] in the past but doesn't feel attached there he says.     Active medical problems are      Super-morbid obesity with body mass index above 50 - has seen Dr Yue Salomon for weight management ; he says he felt Dr. Salomon was steering him towards weight loss surgery which he says he doesn't want and so he's not so sure he will keep seeing her at this point.  Abnormal TSH ( thyroid test )   Hypertension   Hypercholesterolemia   History of seizure [ remote ]  Obstructive sleep apnea      Healthcare maintenance list including need for colonoscopy , human immunodeficiency virus ( HIV ), PHQ-2 depression survey questions , preventative healthcare , ShingRx vaccination against herpes zoster and possibly other vaccinations ?    Healthy Habits:     Getting at least 3 servings of Calcium per day:  Yes    Bi-annual eye exam:  Yes    Dental care twice a year:  NO    Sleep apnea or symptoms of sleep apnea:  Sleep apnea    Diet:  Regular (no restrictions)    Frequency of exercise:  4-5 days/week    Duration of exercise:  15-30 minutes    Taking medications regularly:  Yes    Medication side effects:  No muscle aches, No significant flushing and Lightheadedness    PHQ-2 Total Score: 1    Additional concerns today:  Yes    Hypertension   BP Readings from Last 6 Encounters:   07/22/19 106/76   12/17/18 120/66   12/11/18 110/66   11/19/18 112/70    10/17/18 126/80   05/09/18 130/88     Weight   Body mass index is 51.89 kg/m .  He has followed with Dr. Yue Salomon with weight concerns but is not enthusiastic about having bariatric surgery. He does some walking as he is able.     Heel Pain  He reports that he may think that he has plantar fasciitis because when he walks a good deal he notes pain in his heels for a few days afterwards.     Obstructive Sleep Apnea   He has CPAP machine that he uses regularly.     Anxiety  He reports that he is under a good deal of stress lately. Doesn't report significant depression, maybe some more anxiety.     Urinary Urgency  He reports a slight increase in urinary urgency over the last year. Denies increase in frequency.     ED  Reports that he has not tried sildenafil for erectile dysfunction problems. He is happily  and has been for the last 24 years.     Hyperkeratotic Skin Lesion, Skin tag   He reports that he noted this over the last year or so and wasn't sure that this was growing. He has associated symptoms of minimal itching with this lesion. His skin tag is above the left eye and sometimes bothers him.     Additional Information   Colonoscopy discussed today. New shingrix vaccine discussed with patient and postponed today. History of seizure in high school and was on medications for about 10 years, no recurrence since. He thinks that he may have had a hernia defect in the past but hasn't noted this and definitely hasn't worsened. Has a large annoying skin tag [acrochordon] hanging down from just below his left lower eye    Today's PHQ-2 Score:   PHQ-2 ( 1999 Pfizer) 7/22/2019   Q1: Little interest or pleasure in doing things 0   Q2: Feeling down, depressed or hopeless 0   PHQ-2 Score 0   Q1: Little interest or pleasure in doing things Not at all   Q2: Feeling down, depressed or hopeless Several days   PHQ-2 Score 1     Abuse: Current or Past(Physical, Sexual or Emotional)- No  Do you feel safe in your  environment? No    Social History     Tobacco Use     Smoking status: Never Smoker     Smokeless tobacco: Never Used   Substance Use Topics     Alcohol use: Yes     Comment: Occasional     Alcohol Use 7/22/2019   Prescreen: >3 drinks/day or >7 drinks/week? No   Prescreen: >3 drinks/day or >7 drinks/week? -     Last PSA: No results found for: PSA    Reviewed orders with patient. Reviewed health maintenance and updated orders accordingly - Yes  BP Readings from Last 3 Encounters:   07/22/19 106/76   12/17/18 120/66   12/11/18 110/66    Wt Readings from Last 3 Encounters:   07/22/19 (!) 161.7 kg (356 lb 8 oz)   12/17/18 (!) 154.9 kg (341 lb 8.2 oz)   12/11/18 (!) 154.2 kg (340 lb)         Patient Active Problem List   Diagnosis     Seizures (H)     Essential hypertension     Sleep apnea, unspecified type     Hyperlipidemia, unspecified hyperlipidemia type     BMI 50.0-59.9, adult (H)     Skin tag     Elevated TSH     Past Surgical History:   Procedure Laterality Date     BIOPSY  1978    Hyrocele/cyst     ENT SURGERY  1976?    Tonsils and adnoid     ORTHOPEDIC SURGERY  1985    Right middle finger dislocated surgically repaired     tonsils and adnoids         Social History     Tobacco Use     Smoking status: Never Smoker     Smokeless tobacco: Never Used   Substance Use Topics     Alcohol use: Yes     Comment: Occasional     Family History   Problem Relation Age of Onset     Bronchitis Mother      Hypertension Mother      Depression Mother      Anxiety Disorder Mother      Substance Abuse Mother         Smoking         Reviewed and updated as needed this visit by clinical staff  Tobacco  Allergies  Meds  Med Hx  Surg Hx  Fam Hx  Soc Hx      Reviewed and updated as needed this visit by Provider        Review of Systems   Constitutional: Negative for chills and fever.   HENT: Positive for congestion and hearing loss. Negative for ear pain and sore throat.    Eyes: Positive for visual disturbance. Negative for pain.  "  Respiratory: Positive for shortness of breath. Negative for cough.    Cardiovascular: Positive for chest pain and peripheral edema. Negative for palpitations.   Gastrointestinal: Negative for abdominal pain, constipation, diarrhea, heartburn, hematochezia and nausea.   Genitourinary: Positive for impotence. Negative for discharge, dysuria, frequency, genital sores, hematuria and urgency.   Musculoskeletal: Positive for arthralgias and myalgias. Negative for joint swelling.   Skin: Negative for rash.   Neurological: Positive for dizziness and headaches. Negative for weakness and paresthesias.   Psychiatric/Behavioral: Negative for mood changes. The patient is nervous/anxious.      This document serves as a record of the services and decisions personally performed and made by Adrián Randall MD. It was created on his behalf by Emir Tay, a trained medical scribe. The creation of this document is based on the provider's statements to the medical scribe.  Emir Tay 4:45 PM July 22, 2019    OBJECTIVE:   /76   Pulse 99   Temp 97.1  F (36.2  C) (Oral)   Resp 20   Ht 1.765 m (5' 9.5\")   Wt (!) 161.7 kg (356 lb 8 oz)   SpO2 98%   BMI 51.89 kg/m      Physical Exam  GENERAL: healthy, alert and no distress  EYES: Eyes grossly normal to inspection, PERRL and conjunctivae and sclerae normal  HENT: ear canals and TM's normal, nose and mouth without ulcers or lesions  NECK: no adenopathy, no asymmetry, masses, or scars and thyroid normal to palpation  RESP: lungs clear to auscultation - no rales, rhonchi or wheezes  CV: regular rate and rhythm, normal S1 S2, no S3 or S4, no murmur, click or rub, no peripheral edema and peripheral pulses strong  ABDOMEN: soft, nontender, no hepatosplenomegaly, no masses and bowel sounds normal  MS: he has 1+ pitting pedal edema bilaterally, he has soreness with the heels bilaterally  SKIN: he has some scattered hyperkeratotic skin lesions and a skin tag above the left " eye  NEURO: Normal strength and tone, mentation intact and speech normal  PSYCH: mentation appears normal, affect normal/bright    Diagnostic Test Results:  Labs reviewed in Epic  No results found for this or any previous visit (from the past 24 hour(s)).    ASSESSMENT/PLAN:   (N52.9) Erectile dysfunction, unspecified erectile dysfunction type  (primary encounter diagnosis)  Comment: new diagnosis, diagnosis explained, mechanism of action of phosphodiesterase type 5 (PDE5) inhibitors reviewed   Plan: sildenafil (REVATIO) 20 MG tablet            (I10) Essential hypertension  Comment: continue current plan of care    Plan: hydrochlorothiazide (HYDRODIURIL) 25 MG tablet,        lisinopril (PRINIVIL/ZESTRIL) 10 MG tablet,         Basic metabolic panel, Hemoglobin            (L91.8) Skin tag  Comment: removed. With curved iris scissor and forceps   Plan: REMOVAL OF SKIN TAGS, EA ADDTL 10        Limited bleeding, band aid applied    (Z00.00) Routine history and physical examination of adult  Comment: routine   Plan: TSH with free T4 reflex, Lipid panel reflex to         direct LDL Non-fasting, Basic metabolic panel,         Hemoglobin            (Z12.11) Special screening for malignant neoplasms, colon  Comment: due for this test / procedure / healthcare maintenance   Plan: GASTROENTEROLOGY ADULT REF PROCEDURE ONLY            (M72.2) Plantar fasciitis  Comment: diagnosis consistent with this, see informational material printed and given to patient   Plan:     (R79.89) Elevated TSH  Comment: To be rechecked  Plan:     (E78.5) Hyperlipidemia, unspecified hyperlipidemia type  Comment: routine screening   Plan: Lipid panel reflex to direct LDL Non-fasting        Follow up as indicated on results     (G47.30) Sleep apnea, unspecified type  Comment: noted as a point of historical importance   Plan: Hemoglobin            (Z68.43) BMI 50.0-59.9, adult (H)  Comment: weight loss measures reviewed   Plan: therapeutic lifestyle  "changes encouraged     COUNSELING:   Reviewed preventive health counseling, as reflected in patient instructions  Special attention given to:        Regular exercise       Immunizations    Declined: Zoster due to Other postponed patient will read about this and get back to me on this maybe at next visit           Colon cancer screening    Estimated body mass index is 51.89 kg/m  as calculated from the following:    Height as of this encounter: 1.765 m (5' 9.5\").    Weight as of this encounter: 161.7 kg (356 lb 8 oz).     Weight management plan: Discussed healthy diet and exercise guidelines was originally following with weight loss specialist but unenthusiastic about surgical options     reports that he has never smoked. He has never used smokeless tobacco.    Counseling Resources:  ATP IV Guidelines  Pooled Cohorts Equation Calculator  FRAX Risk Assessment  ICSI Preventive Guidelines  Dietary Guidelines for Americans, 2010  USDA's MyPlate  ASA Prophylaxis  Lung CA Screening    The information in this document, created by the medical scribe for me, accurately reflects the services I personally performed and the decisions made by me. I have reviewed and approved this document for accuracy prior to leaving the patient care area.  July 22, 2019 4:53 PM    Adrián Randall MD  [facility]  "

## 2019-07-22 NOTE — PATIENT INSTRUCTIONS
1) We will do some labs screening today and follow-up for results on these numbers.     2) You should schedule a colonoscopy screening. Plan this for a time that works for you over the next few months.     3) We postponed ShingRx the new shingles vaccine. Look into this and it's something that you can do at any pharmacy and will require a second booster 2-6 months after the first shot. You may want to check with insurance to see if they will cover the vaccine.     4) Please read the following and follow-up with our podiatrist Dr. Duran if you are still having trouble with Plantar Fasciitis.     Patient Education     Plantar Fasciitis  Plantar fasciitis is a painful swelling of the plantar fascia. The plantar fascia is a thick, fibrous layer of tissue that covers the bones on the bottom of your foot. It supports the foot bones in an arched position.  Plantar fasciitis can happen gradually or suddenly. It usually affects one foot at a time. Heel pain can be sharp, like a knife sticking into the bottom of your foot. You may feel pain after exercising, long-distance jogging, stair climbing, long periods of standing, or after standing up.  Risk factors include: non-active lifestyle, arthritis, diabetes, obesity or recent weight gain, flat foot, high arch. Wearing high heels, loose shoes, or shoes with poor arch support for long periods of time adds to the risk. This problem is commonly found in runners and dancers. It also found in people who stand on hard surfaces for long periods of time.  Foot pain from this condition is usually worse in the morning. But it often improves with walking. By the end of the day there may be a dull aching. Treatment requires short-term rest and controlling swelling. It may take up to 9 months before all symptoms go away. Rarely, a steroid injection into the foot, or surgery, may be needed.  Home care    If you are overweight, lose weight to help healing.    Choose supportive shoes with  good arch support and shock absorbency. Replace athletic shoes when they become worn out. Don t walk or run barefoot.    Premade or custom-fitted shoe inserts may be helpful. Inserts made of silicone seem to be the most effective. Custom-made inserts can be provided by foot specialist, physical therapist, or orthopedist.    Premade or custom-made night splints keep the heel stretched out while you sleep. They may prevent morning pain.    Limit activities that stress the feet: jogging, prolonged standing or walking, contact sports, etc.    First thing in the morning and before sports, stretch the bottom of your feet. Gently flex your ankle so the toes move toward your knee.    Icing may help control heel pain. Apply an ice pack to the heel for 10 to 20 minutes as a preventive. Or ice your heel after a severe flare-up of symptoms. You may repeat this every 1 to 2 hours as needed.    You may use over-the-counter pain medicine to control pain, unless another medicine was prescribed. Anti-inflammatory pain medicines, such as ibuprofen or naproxen, may work better than acetaminophen. If you have chronic liver or kidney disease or ever had a stomach ulcer or gastrointestinal bleeding, talk with your healthcare provider before using these medicines.  Follow-up care  Follow up with your healthcare provider, or as advised.  Call for an appointment if pain worsens or there is no relief after a few weeks of home treatment. Shoe inserts, a night splint, or a special boot may be required.  If X-rays were taken, you will be told of any new findings that may affect your care.  When to seek medical advice  Call your healthcare provider right away if any of these occur:    Foot swelling    Redness or warmth with increasing pain  Date Last Reviewed: 5/1/2018 2000-2018 [employer]. [address] [place] [address], [place] [address]. All rights reserved. This information is not intended as a substitute for professional medical  care. Always follow your healthcare professional's instructions.           Patient Education     Treating Plantar Fasciitis  First, your healthcare provider tries to find out the cause of your problem. He or she can then suggest ways to ease pain. If your pain is due to poor foot mechanics, occasionally custom-made shoe inserts (orthoses) may help.    Ease symptoms    To relieve mild symptoms, try aspirin, ibuprofen, or other medicines as directed. Rubbing ice on the area may also help.    To lessen severe pain and swelling, your healthcare provider may give you pills or injections. In some cases, you may need a walking cast. Physical therapy, such as ultrasound or a daily stretching program, may also be recommended. Surgery is rarely needed.    To ease symptoms caused by poor foot mechanics, your foot may be taped. This supports the arch and temporarily controls movement. Night splints may also help by stretching the fascia.  Control movement  If taping helps, your healthcare provider may prescribe orthoses. These are inserts built from plaster casts of your feet. They control the way your foot moves. As a result, your symptoms may go away.  Reduce overuse  Every time your foot strikes the ground, the plantar fascia is stretched. You can lessen the strain on the plantar fascia and the chance of overuse by:    Losing any excess weight    Not running on hard or uneven ground    Using orthoses, if recommended, in your shoes and house slippers  If surgery is needed  Your healthcare provider may consider surgery if other types of treatment don't control your pain. During surgery, the plantar fascia is partially cut to release tension. As you heal, fibrous tissue fills the space between the heel bone and the plantar fascia.   Date Last Reviewed: 1/1/2018 2000-2018 [employer]. [address] [place] [id number]. All rights reserved. This information is not intended as a substitute for professional  medical care. Always follow your healthcare professional's instructions.

## 2019-07-22 NOTE — PROGRESS NOTES
Called patient and left voicemail message encouraging patient to return call to clinic in order to schedule an appointment to be seen for ear pain.     Encounter closed at this time.    New patient to the Internal Medicine department . Here for complete physical exam . Primary care patient with Dr. José Antonio Malhotra at [facility]     Active medical problems are     Super-morbid obesity with body mass index above 50 - sees Dr Yue Salomon for weight management   Abnormal TSH ( thyroid test )   Hypertension   Hypercholesterolemia   History of seizure   Obstructive sleep apnea     Healthcare maintenance list including need for colonoscopy , human immunodeficiency virus ( HIV ), PHQ-2 depression survey questions , preventative healthcare , ShingRx vaccination against herpes zoster and possibly other vaccinations ?  Answers for HPI/ROS submitted by the patient on 7/22/2019   Annual Exam:  Frequency of exercise:: 4-5 days/week  Getting at least 3 servings of Calcium per day:: Yes  Diet:: Regular (no restrictions)  Taking medications regularly:: Yes  Medication side effects:: No muscle aches, No significant flushing, Lightheadedness  Bi-annual eye exam:: Yes  Dental care twice a year:: NO  Sleep apnea or symptoms of sleep apnea:: Sleep apnea  abdominal pain: No  Blood in stool: No  Blood in urine: No  chest pain: Yes  chills: No  congestion: Yes  constipation: No  cough: No  diarrhea: No  dizziness: Yes  ear pain: No  eye pain: No  nervous/anxious: Yes  fever: No  frequency: No  genital sores: No  headaches: Yes  hearing loss: Yes  heartburn: No  arthralgias: Yes  joint swelling: No  peripheral edema: Yes  mood changes: No  myalgias: Yes  nausea: No  dysuria: No  palpitations: No  Skin sensation changes: No  sore throat: No  urgency: No  rash: No  shortness of breath: Yes  visual disturbance: Yes  weakness: No  impotence: Yes  penile discharge: No  Additional concerns today:: Yes  Duration of exercise:: 15-30 minutes

## 2019-07-23 LAB
ANION GAP SERPL CALCULATED.3IONS-SCNC: 5 MMOL/L (ref 3–14)
BUN SERPL-MCNC: 16 MG/DL (ref 7–30)
CALCIUM SERPL-MCNC: 9.2 MG/DL (ref 8.5–10.1)
CHLORIDE SERPL-SCNC: 104 MMOL/L (ref 94–109)
CHOLEST SERPL-MCNC: 222 MG/DL
CO2 SERPL-SCNC: 29 MMOL/L (ref 20–32)
CREAT SERPL-MCNC: 1.19 MG/DL (ref 0.66–1.25)
GFR SERPL CREATININE-BSD FRML MDRD: 71 ML/MIN/{1.73_M2}
GLUCOSE SERPL-MCNC: 84 MG/DL (ref 70–99)
HDLC SERPL-MCNC: 28 MG/DL
LDLC SERPL CALC-MCNC: 128 MG/DL
NONHDLC SERPL-MCNC: 194 MG/DL
POTASSIUM SERPL-SCNC: 4.3 MMOL/L (ref 3.4–5.3)
SODIUM SERPL-SCNC: 138 MMOL/L (ref 133–144)
TRIGL SERPL-MCNC: 329 MG/DL
TSH SERPL DL<=0.005 MIU/L-ACNC: 3.61 MU/L (ref 0.4–4)

## 2019-09-30 DIAGNOSIS — N52.9 ERECTILE DYSFUNCTION, UNSPECIFIED ERECTILE DYSFUNCTION TYPE: ICD-10-CM

## 2019-09-30 NOTE — TELEPHONE ENCOUNTER
Please send prescription to new pharmacy.       Stella CACERES CMA ([facility])

## 2019-10-02 RX ORDER — SILDENAFIL CITRATE 20 MG/1
20 TABLET ORAL DAILY PRN
Qty: 30 TABLET | Refills: 3 | Status: SHIPPED | OUTPATIENT
Start: 2019-10-02

## 2020-02-12 ENCOUNTER — MYC REFILL (OUTPATIENT)
Dept: FAMILY MEDICINE | Facility: CLINIC | Age: 50
End: 2020-02-12

## 2020-02-12 DIAGNOSIS — I10 ESSENTIAL HYPERTENSION: ICD-10-CM

## 2020-02-14 RX ORDER — LISINOPRIL 10 MG/1
10 TABLET ORAL DAILY
Qty: 90 TABLET | Refills: 1 | Status: SHIPPED | OUTPATIENT
Start: 2020-02-14

## 2020-02-14 RX ORDER — HYDROCHLOROTHIAZIDE 25 MG/1
25 TABLET ORAL DAILY
Qty: 90 TABLET | Refills: 1 | Status: SHIPPED | OUTPATIENT
Start: 2020-02-14

## 2020-02-23 ENCOUNTER — HEALTH MAINTENANCE LETTER (OUTPATIENT)
Age: 50
End: 2020-02-23

## 2020-12-12 ENCOUNTER — HEALTH MAINTENANCE LETTER (OUTPATIENT)
Age: 50
End: 2020-12-12

## 2021-09-26 ENCOUNTER — HEALTH MAINTENANCE LETTER (OUTPATIENT)
Age: 51
End: 2021-09-26

## 2022-01-16 ENCOUNTER — HEALTH MAINTENANCE LETTER (OUTPATIENT)
Age: 52
End: 2022-01-16

## 2023-01-08 ENCOUNTER — HEALTH MAINTENANCE LETTER (OUTPATIENT)
Age: 53
End: 2023-01-08

## 2023-04-23 ENCOUNTER — HEALTH MAINTENANCE LETTER (OUTPATIENT)
Age: 53
End: 2023-04-23